# Patient Record
Sex: FEMALE | Race: OTHER | HISPANIC OR LATINO | URBAN - METROPOLITAN AREA
[De-identification: names, ages, dates, MRNs, and addresses within clinical notes are randomized per-mention and may not be internally consistent; named-entity substitution may affect disease eponyms.]

---

## 2021-07-15 ENCOUNTER — HOSPITAL ENCOUNTER (EMERGENCY)
Facility: HOSPITAL | Age: 21
Discharge: HOME | End: 2021-07-15
Attending: EMERGENCY MEDICINE | Admitting: EMERGENCY MEDICINE
Payer: COMMERCIAL

## 2021-07-15 VITALS
DIASTOLIC BLOOD PRESSURE: 60 MMHG | SYSTOLIC BLOOD PRESSURE: 125 MMHG | RESPIRATION RATE: 16 BRPM | WEIGHT: 135.58 LBS | TEMPERATURE: 98.2 F | HEART RATE: 62 BPM | OXYGEN SATURATION: 100 %

## 2021-07-15 DIAGNOSIS — R42 DIZZINESS: Primary | ICD-10-CM

## 2021-07-15 LAB
ANION GAP SERPL CALCULATED.3IONS-SCNC: 6 MMOL/L (ref 4–13)
ATRIAL RATE: 78 BPM
BASOPHILS # BLD AUTO: 0.03 THOUSANDS/ΜL (ref 0–0.1)
BASOPHILS NFR BLD AUTO: 1 % (ref 0–1)
BILIRUB UR QL STRIP: NEGATIVE
BUN SERPL-MCNC: 8 MG/DL (ref 5–25)
CALCIUM SERPL-MCNC: 8.5 MG/DL (ref 8.3–10.1)
CHLORIDE SERPL-SCNC: 105 MMOL/L (ref 100–108)
CLARITY UR: ABNORMAL
CO2 SERPL-SCNC: 28 MMOL/L (ref 21–32)
COLOR UR: YELLOW
CREAT SERPL-MCNC: 0.66 MG/DL (ref 0.6–1.3)
EOSINOPHIL # BLD AUTO: 0.06 THOUSAND/ΜL (ref 0–0.61)
EOSINOPHIL NFR BLD AUTO: 1 % (ref 0–6)
ERYTHROCYTE [DISTWIDTH] IN BLOOD BY AUTOMATED COUNT: 11.9 % (ref 11.6–15.1)
EXT PREG TEST URINE: NEGATIVE
EXT. CONTROL ED NAV: NORMAL
GFR SERPL CREATININE-BSD FRML MDRD: 127 ML/MIN/1.73SQ M
GLUCOSE SERPL-MCNC: 90 MG/DL (ref 65–140)
GLUCOSE UR STRIP-MCNC: NEGATIVE MG/DL
HCT VFR BLD AUTO: 42.5 % (ref 34.8–46.1)
HGB BLD-MCNC: 14.7 G/DL (ref 11.5–15.4)
HGB UR QL STRIP.AUTO: NEGATIVE
IMM GRANULOCYTES # BLD AUTO: 0.02 THOUSAND/UL (ref 0–0.2)
IMM GRANULOCYTES NFR BLD AUTO: 0 % (ref 0–2)
KETONES UR STRIP-MCNC: NEGATIVE MG/DL
LEUKOCYTE ESTERASE UR QL STRIP: NEGATIVE
LYMPHOCYTES # BLD AUTO: 2.62 THOUSANDS/ΜL (ref 0.6–4.47)
LYMPHOCYTES NFR BLD AUTO: 40 % (ref 14–44)
MCH RBC QN AUTO: 31.5 PG (ref 26.8–34.3)
MCHC RBC AUTO-ENTMCNC: 34.6 G/DL (ref 31.4–37.4)
MCV RBC AUTO: 91 FL (ref 82–98)
MONOCYTES # BLD AUTO: 0.53 THOUSAND/ΜL (ref 0.17–1.22)
MONOCYTES NFR BLD AUTO: 8 % (ref 4–12)
NEUTROPHILS # BLD AUTO: 3.24 THOUSANDS/ΜL (ref 1.85–7.62)
NEUTS SEG NFR BLD AUTO: 50 % (ref 43–75)
NITRITE UR QL STRIP: NEGATIVE
NRBC BLD AUTO-RTO: 0 /100 WBCS
P AXIS: 69 DEGREES
PH UR STRIP.AUTO: >=9 [PH] (ref 4.5–8)
PLATELET # BLD AUTO: 340 THOUSANDS/UL (ref 149–390)
PMV BLD AUTO: 9 FL (ref 8.9–12.7)
POTASSIUM SERPL-SCNC: 3.6 MMOL/L (ref 3.5–5.3)
PR INTERVAL: 156 MS
PROT UR STRIP-MCNC: NEGATIVE MG/DL
QRS AXIS: 67 DEGREES
QRSD INTERVAL: 100 MS
QT INTERVAL: 378 MS
QTC INTERVAL: 430 MS
RBC # BLD AUTO: 4.66 MILLION/UL (ref 3.81–5.12)
SODIUM SERPL-SCNC: 139 MMOL/L (ref 136–145)
SP GR UR STRIP.AUTO: 1.02 (ref 1–1.03)
T WAVE AXIS: 60 DEGREES
UROBILINOGEN UR QL STRIP.AUTO: 0.2 E.U./DL
VENTRICULAR RATE: 78 BPM
WBC # BLD AUTO: 6.5 THOUSAND/UL (ref 4.31–10.16)

## 2021-07-15 PROCEDURE — 81003 URINALYSIS AUTO W/O SCOPE: CPT

## 2021-07-15 PROCEDURE — 85025 COMPLETE CBC W/AUTO DIFF WBC: CPT | Performed by: PHYSICIAN ASSISTANT

## 2021-07-15 PROCEDURE — 93010 ELECTROCARDIOGRAM REPORT: CPT | Performed by: INTERNAL MEDICINE

## 2021-07-15 PROCEDURE — 36415 COLL VENOUS BLD VENIPUNCTURE: CPT | Performed by: PHYSICIAN ASSISTANT

## 2021-07-15 PROCEDURE — 96361 HYDRATE IV INFUSION ADD-ON: CPT

## 2021-07-15 PROCEDURE — 80048 BASIC METABOLIC PNL TOTAL CA: CPT | Performed by: PHYSICIAN ASSISTANT

## 2021-07-15 PROCEDURE — 99284 EMERGENCY DEPT VISIT MOD MDM: CPT

## 2021-07-15 PROCEDURE — 96374 THER/PROPH/DIAG INJ IV PUSH: CPT

## 2021-07-15 PROCEDURE — 81025 URINE PREGNANCY TEST: CPT | Performed by: EMERGENCY MEDICINE

## 2021-07-15 PROCEDURE — 99285 EMERGENCY DEPT VISIT HI MDM: CPT | Performed by: PHYSICIAN ASSISTANT

## 2021-07-15 PROCEDURE — 93005 ELECTROCARDIOGRAM TRACING: CPT

## 2021-07-15 RX ORDER — KETOROLAC TROMETHAMINE 30 MG/ML
15 INJECTION, SOLUTION INTRAMUSCULAR; INTRAVENOUS ONCE
Status: COMPLETED | OUTPATIENT
Start: 2021-07-15 | End: 2021-07-15

## 2021-07-15 RX ORDER — MECLIZINE HYDROCHLORIDE 25 MG/1
25 TABLET ORAL 3 TIMES DAILY PRN
Qty: 30 TABLET | Refills: 0 | Status: SHIPPED | OUTPATIENT
Start: 2021-07-15

## 2021-07-15 RX ORDER — NAPROXEN 500 MG/1
500 TABLET ORAL 2 TIMES DAILY WITH MEALS
Qty: 20 TABLET | Refills: 0 | Status: SHIPPED | OUTPATIENT
Start: 2021-07-15 | End: 2022-07-15

## 2021-07-15 RX ORDER — MECLIZINE HCL 12.5 MG/1
12.5 TABLET ORAL ONCE
Status: COMPLETED | OUTPATIENT
Start: 2021-07-15 | End: 2021-07-15

## 2021-07-15 RX ADMIN — SODIUM CHLORIDE 1000 ML: 0.9 INJECTION, SOLUTION INTRAVENOUS at 12:31

## 2021-07-15 RX ADMIN — KETOROLAC TROMETHAMINE 15 MG: 30 INJECTION, SOLUTION INTRAMUSCULAR; INTRAVENOUS at 12:31

## 2021-07-15 RX ADMIN — MECLIZINE 12.5 MG: 12.5 TABLET ORAL at 12:31

## 2021-07-15 NOTE — ED NOTES
[facility]  [id number] used to explain treatment plan     Brandin Edwards RN  07/15/21 8386

## 2021-07-15 NOTE — ED PROVIDER NOTES
History  Chief Complaint   Patient presents with    Headache     Pt reports headache with dizziness for the past 2 days  Denies vomitting  No medication taken pta  Patient presents emergency department feeling dizzy for the past 2-3 days  She denies any nausea or vomiting  She admits to a mild headache  No cough congestion or fevers  She denies any abdominal pain or diarrhea  Patient states that the dizziness is not as bad today as it was yesterday but she was concerned with her ongoing symptoms and came in for evaluation  Patient has not taken anything for her symptoms  Patient also states that she feels that her hands seem a little bit more pale in color  She was concerned because she states Dorlene Postin are usually a lot more red and they look more white now  None       History reviewed  No pertinent past medical history  History reviewed  No pertinent surgical history  History reviewed  No pertinent family history  I have reviewed and agree with the history as documented  E-Cigarette/Vaping     E-Cigarette/Vaping Substances     Social History     Tobacco Use    Smoking status: Never Smoker    Smokeless tobacco: Former User   Substance Use Topics    Alcohol use: Never    Drug use: Never       Review of Systems   Respiratory: Negative  Cardiovascular: Negative  Gastrointestinal: Negative  Neurological: Positive for dizziness and headaches  All other systems reviewed and are negative  Physical Exam  Physical Exam  Vitals and nursing note reviewed  Constitutional:       Appearance: She is well-developed  HENT:      Head: Normocephalic and atraumatic  Right Ear: Tympanic membrane and external ear normal       Left Ear: Tympanic membrane and external ear normal    Eyes:      Conjunctiva/sclera: Conjunctivae normal    Cardiovascular:      Rate and Rhythm: Normal rate and regular rhythm  Heart sounds: Normal heart sounds     Pulmonary:      Effort: Pulmonary effort is normal       Breath sounds: Normal breath sounds  Abdominal:      General: Bowel sounds are normal       Palpations: Abdomen is soft  Musculoskeletal:         General: Normal range of motion  Cervical back: Neck supple  Lymphadenopathy:      Cervical: No cervical adenopathy  Skin:     General: Skin is warm  Findings: No rash  Neurological:      General: No focal deficit present  Mental Status: She is alert and oriented to person, place, and time  Sensory: Sensation is intact  Motor: No weakness  Coordination: Coordination is intact  Romberg sign negative   Finger-Nose-Finger Test normal    Psychiatric:         Behavior: Behavior normal          Vital Signs  ED Triage Vitals [07/15/21 1149]   Temperature Pulse Respirations Blood Pressure SpO2   98 2 °F (36 8 °C) 62 16 125/60 100 %      Temp Source Heart Rate Source Patient Position - Orthostatic VS BP Location FiO2 (%)   Oral Monitor Sitting Right arm --      Pain Score       --           Vitals:    07/15/21 1149   BP: 125/60   Pulse: 62   Patient Position - Orthostatic VS: Sitting         Visual Acuity      ED Medications  Medications   sodium chloride 0 9 % bolus 1,000 mL (0 mL Intravenous Stopped 7/15/21 1330)   ketorolac (TORADOL) injection 15 mg (15 mg Intravenous Given 7/15/21 1231)   meclizine (ANTIVERT) tablet 12 5 mg (12 5 mg Oral Given 7/15/21 1231)       Diagnostic Studies  Results Reviewed     Procedure Component Value Units Date/Time    Basic metabolic panel [[id number]] Collected: 07/15/21 1233    Lab Status: Final result Specimen: Blood from Arm, Right Updated: 07/15/21 1256     Sodium 139 mmol/L      Potassium 3 6 mmol/L      Chloride 105 mmol/L      CO2 28 mmol/L      ANION GAP 6 mmol/L      BUN 8 mg/dL      Creatinine 0 66 mg/dL      Glucose 90 mg/dL      Calcium 8 5 mg/dL      eGFR 127 ml/min/1 73sq m     Narrative:      [place] guidelines for Chronic Kidney Disease (CKD):   Stage 1 with normal or high GFR (GFR > 90 mL/min/1 73 square meters)    Stage 2 Mild CKD (GFR = 60-89 mL/min/1 73 square meters)    Stage 3A Moderate CKD (GFR = 45-59 mL/min/1 73 square meters)    Stage 3B Moderate CKD (GFR = 30-44 mL/min/1 73 square meters)    Stage 4 Severe CKD (GFR = 15-29 mL/min/1 73 square meters)    Stage 5 End Stage CKD (GFR <15 mL/min/1 73 square meters)  Note: GFR calculation is accurate only with a steady state creatinine    CBC and differential [[id number]] Collected: 07/15/21 1233    Lab Status: Final result Specimen: Blood from Arm, Right Updated: 07/15/21 1242     WBC 6 50 Thousand/uL      RBC 4 66 Million/uL      Hemoglobin 14 7 g/dL      Hematocrit 42 5 %      MCV 91 fL      MCH 31 5 pg      MCHC 34 6 g/dL      RDW 11 9 %      MPV 9 0 fL      Platelets 856 Thousands/uL      nRBC 0 /100 WBCs      Neutrophils Relative 50 %      Immat GRANS % 0 %      Lymphocytes Relative 40 %      Monocytes Relative 8 %      Eosinophils Relative 1 %      Basophils Relative 1 %      Neutrophils Absolute 3 24 Thousands/µL      Immature Grans Absolute 0 02 Thousand/uL      Lymphocytes Absolute 2 62 Thousands/µL      Monocytes Absolute 0 53 Thousand/µL      Eosinophils Absolute 0 06 Thousand/µL      Basophils Absolute 0 03 Thousands/µL     POCT pregnancy, urine [[id number]]  (Normal) Resulted: 07/15/21 1201    Lab Status: Final result Updated: 07/15/21 1201     EXT PREG TEST UR (Ref: Negative) negative     Control valid    Urine Macroscopic, POC [[id number]]  (Abnormal) Collected: 07/15/21 1158    Lab Status: Final result Specimen: Urine Updated: 07/15/21 1159     Color, UA Yellow     Clarity, UA Slightly Cloudy     pH, UA >=9 0     Leukocytes, UA Negative     Nitrite, UA Negative     Protein, UA Negative mg/dl      Glucose, UA Negative mg/dl      Ketones, UA Negative mg/dl      Urobilinogen, UA 0 2 E U /dl      Bilirubin, UA Negative     Blood, UA Negative     Specific Sinclair, UA 1 020 Narrative:      CLINITEK RESULT                 No orders to display              Procedures  ECG 12 Lead Documentation Only    Date/Time: 7/15/2021 12:39 PM  Performed by: Kathy Butt PA-C  Authorized by: Kathy Butt PA-C     Indications / Diagnosis:  Dizziness  Patient location:  ED  Previous ECG:     Previous ECG:  Unavailable  Rate:     ECG rate:  78    ECG rate assessment: normal    Rhythm:     Rhythm: sinus rhythm    Ectopy:     Ectopy: none    QRS:     QRS axis:  Normal  Conduction:     Conduction: normal    ST segments:     ST segments:  Normal  T waves:     T waves: normal    Comments:      Sinus arrhythmia              ED Course                             SBIRT 20yo+      Most Recent Value   SBIRT (24 yo +)   In order to provide better care to our patients, we are screening all of our patients for alcohol and drug use  Would it be okay to ask you these screening questions? No Filed at: 07/15/2021 1252                    [facility]  Number of Diagnoses or Management Options  Dizziness: new and requires workup     Amount and/or Complexity of Data Reviewed  Clinical lab tests: reviewed    Risk of Complications, Morbidity, and/or Mortality  General comments: Instructions reviewed w pt  Discussed results patient's symptoms have all resolved she is feeling much better  Patient Progress  Patient progress: improved      Disposition  Final diagnoses:   Dizziness     Time reflects when diagnosis was documented in both MDM as applicable and the Disposition within this note     Time User Action Codes Description Comment    7/15/2021  1:10 PM Mignon Us Add [R42] Dizziness       ED Disposition     ED Disposition Condition Date/Time Comment    Discharge Stable u Jul 15, 2021  1:10 PM Mike Toro discharge to home/self care              Follow-up Information     Follow up With Specialties Details Why Contact Basilia Manley    [phone]            Discharge Medication List as of 7/15/2021  1:11 PM      START taking these medications    Details   meclizine (ANTIVERT) 25 mg tablet Take 1 tablet (25 mg total) by mouth 3 (three) times a day as needed for dizziness, Starting Thu 7/15/2021, Print      naproxen (EC NAPROSYN) 500 MG EC tablet Take 1 tablet (500 mg total) by mouth 2 (two) times a day with meals PRN Headache, Starting Thu 7/15/2021, Until Fri 7/15/2022, Print           No discharge procedures on file      PDMP Review     None          ED Provider  Electronically Signed by           Tami Flores PA-C  07/15/21 4210

## 2022-08-15 ENCOUNTER — HOSPITAL ENCOUNTER (EMERGENCY)
Facility: HOSPITAL | Age: 22
Discharge: HOME | End: 2022-08-15
Attending: EMERGENCY MEDICINE
Payer: COMMERCIAL

## 2022-08-15 VITALS
DIASTOLIC BLOOD PRESSURE: 60 MMHG | OXYGEN SATURATION: 99 % | RESPIRATION RATE: 16 BRPM | TEMPERATURE: 98.7 F | WEIGHT: 132.28 LBS | SYSTOLIC BLOOD PRESSURE: 129 MMHG | HEART RATE: 103 BPM

## 2022-08-15 DIAGNOSIS — J06.9 VIRAL URI WITH COUGH: Primary | ICD-10-CM

## 2022-08-15 LAB — SARS-COV-2 RNA RESP QL NAA+PROBE: POSITIVE

## 2022-08-15 PROCEDURE — 99284 EMERGENCY DEPT VISIT MOD MDM: CPT

## 2022-08-15 PROCEDURE — 99283 EMERGENCY DEPT VISIT LOW MDM: CPT

## 2022-08-15 PROCEDURE — U0005 INFEC AGEN DETEC AMPLI PROBE: HCPCS

## 2022-08-15 PROCEDURE — U0003 INFECTIOUS AGENT DETECTION BY NUCLEIC ACID (DNA OR RNA); SEVERE ACUTE RESPIRATORY SYNDROME CORONAVIRUS 2 (SARS-COV-2) (CORONAVIRUS DISEASE [COVID-19]), AMPLIFIED PROBE TECHNIQUE, MAKING USE OF HIGH THROUGHPUT TECHNOLOGIES AS DESCRIBED BY CMS-2020-01-R: HCPCS

## 2022-08-15 NOTE — ED PROVIDER NOTES
History  Chief Complaint   Patient presents with    URI     Pt c/o cough and sore throat for 2 days     24 y/o female with no significant PMH who presents with cough and sore throat x1 day  Asking for COVID test  Non-productive cough, generalized body aches, headache  Has not received COVID vaccinations  Denies f/c, CP, SOB, abdominal pain, N/V/D, no LE swelling, no visual changes, no hearing changes  No travel, no sick contact  Prior to Admission Medications   Prescriptions Last Dose Informant Patient Reported? Taking?   meclizine (ANTIVERT) 25 mg tablet   No No   Sig: Take 1 tablet (25 mg total) by mouth 3 (three) times a day as needed for dizziness   naproxen (EC NAPROSYN) 500 MG EC tablet   No No   Sig: Take 1 tablet (500 mg total) by mouth 2 (two) times a day with meals PRN Headache      Facility-Administered Medications: None       History reviewed  No pertinent past medical history  History reviewed  No pertinent surgical history  History reviewed  No pertinent family history  I have reviewed and agree with the history as documented  E-Cigarette/Vaping     E-Cigarette/Vaping Substances     Social History     Tobacco Use    Smoking status: Never Smoker    Smokeless tobacco: Former User   Substance Use Topics    Alcohol use: Never    Drug use: Never       Review of Systems   Constitutional: Negative for chills and fever  HENT: Positive for sore throat  Negative for ear discharge, ear pain, hearing loss, postnasal drip, rhinorrhea, sneezing and trouble swallowing  Eyes: Negative for pain and visual disturbance  Respiratory: Positive for cough (non-productive)  Negative for shortness of breath  Cardiovascular: Negative for chest pain and leg swelling  Gastrointestinal: Negative for abdominal pain, diarrhea, nausea and vomiting  Genitourinary: Negative  Musculoskeletal: Negative  Skin: Negative  Neurological: Positive for headaches   Negative for dizziness, syncope, weakness, light-headedness and numbness  Physical Exam  Physical Exam  Vitals reviewed  Constitutional:       General: She is not in acute distress  Appearance: Normal appearance  She is normal weight  She is not ill-appearing  HENT:      Head: Normocephalic and atraumatic  Right Ear: External ear normal       Left Ear: External ear normal       Nose: Nose normal       Mouth/Throat:      Mouth: Mucous membranes are moist       Pharynx: Oropharynx is clear  No oropharyngeal exudate or posterior oropharyngeal erythema  Eyes:      General:         Right eye: No discharge  Left eye: No discharge  Extraocular Movements: Extraocular movements intact  Conjunctiva/sclera: Conjunctivae normal    Cardiovascular:      Rate and Rhythm: Normal rate and regular rhythm  Heart sounds: Normal heart sounds  Pulmonary:      Effort: Pulmonary effort is normal       Breath sounds: Normal breath sounds  Musculoskeletal:      Cervical back: Normal range of motion and neck supple  No tenderness  Right lower leg: No edema  Left lower leg: No edema  Skin:     General: Skin is warm and dry  Capillary Refill: Capillary refill takes less than 2 seconds  Neurological:      General: No focal deficit present  Mental Status: She is alert and oriented to person, place, and time  Mental status is at baseline  Vital Signs  ED Triage Vitals [08/15/22 1339]   Temperature Pulse Respirations Blood Pressure SpO2   98 7 °F (37 1 °C) 103 16 129/60 99 %      Temp Source Heart Rate Source Patient Position - Orthostatic VS BP Location FiO2 (%)   Oral -- -- -- --      Pain Score       4           Vitals:    08/15/22 1339   BP: 129/60   Pulse: 103         Visual Acuity      ED Medications  Medications - No data to display    Diagnostic Studies  Results Reviewed     Procedure Component Value Units Date/Time    COVID only [[id number]] Collected: 08/15/22 1541    Lab Status:  In process Specimen: Nares from Nose Updated: 08/15/22 1546                 No orders to display              Procedures  Procedures         ED Course                                             MDM  Number of Diagnoses or Management Options  Diagnosis management comments: 26 y/o female with no significant PMH who presents with generalized body aches, non-productive cough, and sore throat x1 day  Deny f/c, cp, sob, abd pain, n/v/d, urinary symptoms, LE swelling  Lungs cta b/l, heart RRR  Oral exam with no exudates or erythema    COVID test, told will call if positive result  Discussed return criteria to ED  Amount and/or Complexity of Data Reviewed  Clinical lab tests: ordered and reviewed  Tests in the medicine section of CPT®: reviewed and ordered  Discuss the patient with other providers: yes        Disposition  Final diagnoses:   Viral URI with cough     Time reflects when diagnosis was documented in both MDM as applicable and the Disposition within this note     Time User Action Codes Description Comment    8/15/2022  3:38 PM Emigdio Quinones Add [J06 9] Viral URI with cough       ED Disposition     ED Disposition   Discharge    Condition   Stable    Date/Time   Mon Aug 15, 2022  3:38 PM    Cristobal Scott discharge to home/self care  Follow-up Information    None         Discharge Medication List as of 8/15/2022  3:43 PM      CONTINUE these medications which have NOT CHANGED    Details   meclizine (ANTIVERT) 25 mg tablet Take 1 tablet (25 mg total) by mouth 3 (three) times a day as needed for dizziness, Starting Thu 7/15/2021, Print      naproxen (EC NAPROSYN) 500 MG EC tablet Take 1 tablet (500 mg total) by mouth 2 (two) times a day with meals PRN Headache, Starting Thu 7/15/2021, Until Fri 7/15/2022, Print             No discharge procedures on file      PDMP Review     None          ED Provider  Electronically Signed by           Heide Renee PA-C  08/15/22 8981

## 2022-08-15 NOTE — ED ATTENDING ATTESTATION
8/15/2022  Malorie ROMERO, saw and evaluated the patient  I have discussed the patient with the resident/non-physician practitioner and agree with the resident's/non-physician practitioner's findings, Plan of Care, and MDM as documented in the resident's/non-physician practitioner's note, except where noted  All available labs and Radiology studies were reviewed  I was present for key portions of any procedure(s) performed by the resident/non-physician practitioner and I was immediately available to provide assistance  At this point I agree with the current assessment done in the Emergency Department  I have conducted an independent evaluation of this patient a history and physical is as follows:      A 25-year-old female with no significant past medical history; presents with a nonproductive cough, headache, generalized body aches and sore throat that began today  Patient otherwise denies fever, chills, chest pain, shortness of breath, abdominal pain, nausea, vomiting, diarrhea, peripheral edema and rashes  Pt denies known sick contacts  Physical Exam  General: awake, alert, no acute distress  Head: normocephalic, atraumatic  Eyes: no scleral icterus  Ears: external ears normal, hearing grossly intact  Nose: external exam grossly normal, negative nasal discharge  Neck: symmetric, No JVD noted, trachea midline  Pulmonary: no respiratory distress, no tachypnea noted  Cardiovascular: appears well perfused  Abdomen: no distention noted  Musculoskeletal: no deformities noted, tone normal  Neuro: grossly non-focal, pt voluntarily moving bilateral upper and lower extremities equally and purposefully  Psych: mood and affect appropriate    Assessment and Plan:  Upper respiratory infection, likely viral in nature  Will screen for COVID  Recommend continued symptomatic treatment        ED Course         Critical Care Time  Procedures

## 2022-08-15 NOTE — DISCHARGE INSTRUCTIONS
Te llamaremos si le sale positivo el examen de COVID    Puedes samantha acetaminophen o advil para dolor de cuerpo, fiebre     Regresar a urgencias si tienes dificultad respirando, dolor de peche, toz con Terrell

## 2022-08-22 ENCOUNTER — HOSPITAL ENCOUNTER (EMERGENCY)
Facility: HOSPITAL | Age: 22
Discharge: HOME | End: 2022-08-22
Attending: EMERGENCY MEDICINE | Admitting: EMERGENCY MEDICINE
Payer: COMMERCIAL

## 2022-08-22 VITALS
TEMPERATURE: 98.4 F | SYSTOLIC BLOOD PRESSURE: 113 MMHG | HEART RATE: 64 BPM | WEIGHT: 129.85 LBS | RESPIRATION RATE: 18 BRPM | OXYGEN SATURATION: 99 % | DIASTOLIC BLOOD PRESSURE: 61 MMHG

## 2022-08-22 DIAGNOSIS — Z02.89 ENCOUNTER TO OBTAIN EXCUSE FROM WORK: Primary | ICD-10-CM

## 2022-08-22 PROCEDURE — 99281 EMR DPT VST MAYX REQ PHY/QHP: CPT | Performed by: PHYSICIAN ASSISTANT

## 2022-08-22 PROCEDURE — 99281 EMR DPT VST MAYX REQ PHY/QHP: CPT

## 2022-08-22 NOTE — Clinical Note
Mary Forde was seen and treated in our emergency department on 8/22/2022  Diagnosis:     Rangel Chapman  may return to work on return date  She may return on this date: 08/23/2022    Patient has completed the recommended quarantine timeframe for COVID and has been without symptoms for 24 hours  Patient may return to work  Repeat testing is not indicated  If you have any questions or concerns, please don't hesitate to call        Yolette Teixeira PA-C    ______________________________           _______________          _______________  Hospital Representative                              Date                                Time

## 2022-08-22 NOTE — ED PROVIDER NOTES
HPI: Patient is a 25 y o  female who presents with  no symptoms, requires screening  Patient was diagnosed with COVID 1 week ago when she had URI symptoms  Patient states that all her symptoms have resolved and she is feeling better  Patient states she has not had symptoms for at least the last 24 hours, including fevers, chills, diaphoresis, congestion, cough  Patient states she is otherwise in her usual state of health and denies any headache, dizziness, chest pain, difficulty breathing, abdominal pain, vomiting, diarrhea, urinary changes  Patient came today to have repeat COVID test in order to return to work  Allergies   Allergen Reactions    Penicillins Hives       History reviewed  No pertinent past medical history  History reviewed  No pertinent surgical history  Social History     Tobacco Use    Smoking status: Never Smoker    Smokeless tobacco: Former User   Substance Use Topics    Alcohol use: Never    Drug use: Never       Nursing notes reviewed  Physical Exam:  ED Triage Vitals [08/22/22 0935]   Temperature Pulse Respirations Blood Pressure SpO2   98 4 °F (36 9 °C) 64 18 113/61 99 %      Temp Source Heart Rate Source Patient Position - Orthostatic VS BP Location FiO2 (%)   Oral Monitor Sitting Right arm --      Pain Score       No Pain           ROS: Positive for nothing  The remainder of a 10 organ system ROS was otherwise unremarkable    General: awake, alert, no acute distress    Head: normocephalic, atraumatic    Eyes: no scleral icterus  Ears: external ears normal, hearing grossly intact  Nose: external exam grossly normal, negative nasal discharge  Neck: symmetric, No JVD noted, trachea midline  Pulmonary: no respiratory distress, no tachypnea noted  Cardiovascular: appears well perfused  Abdomen: no distention noted  Musculoskeletal: no deformities noted, tone normal  Neuro: grossly non-focal  Psych: mood and affect appropriate    The patient is stable and has a history and physical exam consistent with a viral illness  COVID19 testing has not been performed  I considered the patient's other medical conditions as applicable/noted above in my medical decision making  The patient is stable upon discharge  The plan is for supportive care at home  Repeat COVID testing for negative test in order to return to work is not indicated  Provided education to patient, answered questions  Patient has completed appropriate quarantine time frame and is appropriate to return to work  Provided information accordingly  Recommended follow-up with PCP as needed  The management plan was discussed in detail with the patient at bedside and all questions were answered  Provided both verbal and written instructions  Reviewed red flag symptoms and strict return to ED instructions  Patient notes understanding and agrees to plan  The patient (and any family present) verbalized understanding of the discharge instructions and warnings that would necessitate return to the Emergency Department  All questions were answered prior to discharge  Medications - No data to display  Final diagnoses:   Encounter to obtain excuse from work     Time reflects when diagnosis was documented in both MDM as applicable and the Disposition within this note     Time User Action Codes Description Comment    8/22/2022  9:47 AM Kimber Clark Add [Z02 [id number]] Encounter to obtain excuse from work       ED Disposition     ED Disposition   Discharge    Condition   Stable    Date/Time   Mon Aug 22, 2022  9:47 AM    Comment   Noreen Najera discharge to home/self care                 Follow-up Information     Follow up With Specialties Details Why Contact Info Additional [address] [facility] Emergency Department Emergency Medicine  If symptoms worsen [place] [id number]  [address] [facility] Emergency Department, [address] [facility] [address] , [place] [facility], [id number] Follow-up with PCP as needed             Patient's Medications   Discharge Prescriptions    No medications on file     No discharge procedures on file      Electronically Signed by     Chana Kaye PA-C  08/22/22 0563

## 2022-12-28 ENCOUNTER — HOSPITAL ENCOUNTER (EMERGENCY)
Facility: HOSPITAL | Age: 22
Discharge: HOME | End: 2022-12-28
Attending: EMERGENCY MEDICINE

## 2022-12-28 VITALS
SYSTOLIC BLOOD PRESSURE: 135 MMHG | RESPIRATION RATE: 20 BRPM | TEMPERATURE: 98.2 F | WEIGHT: 140.43 LBS | OXYGEN SATURATION: 100 % | DIASTOLIC BLOOD PRESSURE: 61 MMHG | HEART RATE: 95 BPM

## 2022-12-28 DIAGNOSIS — R68.89 FLU-LIKE SYMPTOMS: Primary | ICD-10-CM

## 2022-12-28 RX ORDER — IBUPROFEN 400 MG/1
400 TABLET ORAL EVERY 6 HOURS PRN
Qty: 20 TABLET | Refills: 0 | Status: SHIPPED | OUTPATIENT
Start: 2022-12-28

## 2022-12-28 RX ORDER — ACETAMINOPHEN 500 MG
500 TABLET ORAL EVERY 6 HOURS PRN
Qty: 30 TABLET | Refills: 0 | Status: SHIPPED | OUTPATIENT
Start: 2022-12-28

## 2022-12-28 RX ORDER — BENZONATATE 100 MG/1
100 CAPSULE ORAL 3 TIMES DAILY PRN
Qty: 21 CAPSULE | Refills: 0 | Status: SHIPPED | OUTPATIENT
Start: 2022-12-28

## 2022-12-28 NOTE — Clinical Note
Lacy Zarate was seen and treated in our emergency department on 12/28/2022  Diagnosis:     Shikha    She may return on this date: 01/02/2023         If you have any questions or concerns, please don't hesitate to call        Bessie Riddle PA-C    ______________________________           _______________          _______________  Hospital Representative                              Date                                Time

## 2022-12-29 LAB
FLUAV RNA RESP QL NAA+PROBE: NEGATIVE
FLUBV RNA RESP QL NAA+PROBE: NEGATIVE
SARS-COV-2 RNA RESP QL NAA+PROBE: NEGATIVE

## 2022-12-29 NOTE — ED PROVIDER NOTES
HPI: Patient is a 25 y o  female who presents with four days of cough, sore throat and fatigue which the patient describes at moderate The patient has not had contact with people with similar symptoms  The patient taken OTC medication with relief of symptoms  Allergies   Allergen Reactions   • Penicillins Hives       History reviewed  No pertinent past medical history  History reviewed  No pertinent surgical history  Social History     Tobacco Use   • Smoking status: Never   • Smokeless tobacco: Former   Substance Use Topics   • Alcohol use: Never   • Drug use: Never       Nursing notes reviewed  Physical Exam:  ED Triage Vitals [12/28/22 1838]   Temperature Pulse Respirations Blood Pressure SpO2   98 2 °F (36 8 °C) 95 20 135/61 100 %      Temp Source Heart Rate Source Patient Position - Orthostatic VS BP Location FiO2 (%)   Oral Monitor Sitting Left arm --      Pain Score       --           ROS: Positive for cough, sore throat and fatigue, the remainder of a 10 organ system ROS was otherwise unremarkable  General: awake, alert, no acute distress  Head: normocephalic, atraumatic  Eyes: no scleral icterus  Ears: external ears normal, hearing grossly intact  Nose: external exam grossly normal, negative nasal discharge  Neck: symmetric, No JVD noted, trachea midline  Pulmonary: no respiratory distress, no tachypnea noted  Lungs CTAB  Cardiovascular: appears well perfused  RRR, no M/R/G  Abdomen: no distention noted  Musculoskeletal: no deformities noted, tone normal  Neuro: grossly non-focal  Psych: mood and affect appropriate    The patient is stable and has a history and physical exam consistent with a viral illness  COVID19/influenza testing has been performed  I considered the patient's other medical conditions as applicable/noted above in my medical decision making  The patient is stable upon discharge  The plan is for supportive care at home      The patient (and any family present) verbalized understanding of the discharge instructions and warnings that would necessitate return to the Emergency Department  All questions were answered prior to discharge  Medications - No data to display  Final diagnoses:   Flu-like symptoms     Time reflects when diagnosis was documented in both MDM as applicable and the Disposition within this note     Time User Action Codes Description Comment    12/28/2022  7:13 PM Lonnie Israel Sarkar [R68 89] Flu-like symptoms       ED Disposition     ED Disposition   Discharge    Condition   Stable    Date/Time   Wed Dec 28, 2022  7:12 PM    Cristobal 79 discharge to home/self care                 Follow-up Information     Follow up With Specialties Details Why [address] [facility] Emergency Department Emergency Medicine  If symptoms worsen [facility] [id number]  [address] [facility] [place], operated by [facility], [address], [place], [address]        Discharge Medication List as of 12/28/2022  7:19 PM      START taking these medications    Details   acetaminophen (TYLENOL) 500 mg tablet Take 1 tablet (500 mg total) by mouth every 6 (six) hours as needed for mild pain, moderate pain or fever, Starting Wed 12/28/2022, Normal      benzonatate (TESSALON PERLES) 100 mg capsule Take 1 capsule (100 mg total) by mouth 3 (three) times a day as needed for cough, Starting Wed 12/28/2022, Normal      ibuprofen (MOTRIN) 400 mg tablet Take 1 tablet (400 mg total) by mouth every 6 (six) hours as needed for mild pain, Starting Wed 12/28/2022, Normal         CONTINUE these medications which have NOT CHANGED    Details   meclizine (ANTIVERT) 25 mg tablet Take 1 tablet (25 mg total) by mouth 3 (three) times a day as needed for dizziness, Starting Thu 7/15/2021, Print      naproxen (EC NAPROSYN) 500 MG EC tablet Take 1 tablet (500 mg total) by mouth 2 (two) times a day with meals PRN Headache, Starting Thu 7/15/2021, Until Fri 7/15/2022, Print           No discharge procedures on file      Electronically Signed by       Khadar Durán PA-C  12/28/22 5869

## 2022-12-29 NOTE — DISCHARGE INSTRUCTIONS
Please refer to the attached information for strict return instructions  If symptoms worsen or new symptoms develop please return to the ER  We will call with COVID19/flu test results when available

## 2023-02-10 ENCOUNTER — HOSPITAL ENCOUNTER (EMERGENCY)
Facility: HOSPITAL | Age: 23
Discharge: HOME | End: 2023-02-10
Attending: EMERGENCY MEDICINE

## 2023-02-10 VITALS
RESPIRATION RATE: 17 BRPM | HEART RATE: 71 BPM | WEIGHT: 140.87 LBS | TEMPERATURE: 98.4 F | SYSTOLIC BLOOD PRESSURE: 134 MMHG | DIASTOLIC BLOOD PRESSURE: 65 MMHG | OXYGEN SATURATION: 100 %

## 2023-02-10 DIAGNOSIS — R03.0 ELEVATED BLOOD PRESSURE READING: ICD-10-CM

## 2023-02-10 DIAGNOSIS — R11.0 NAUSEA: ICD-10-CM

## 2023-02-10 DIAGNOSIS — R42 VERTIGO: ICD-10-CM

## 2023-02-10 DIAGNOSIS — R51.9 ACUTE HEADACHE: Primary | ICD-10-CM

## 2023-02-10 DIAGNOSIS — H65.90 SEROUS OTITIS MEDIA: ICD-10-CM

## 2023-02-10 LAB
EXT PREGNANCY TEST URINE: NEGATIVE
EXT. CONTROL: NORMAL

## 2023-02-10 RX ORDER — METOCLOPRAMIDE HYDROCHLORIDE 5 MG/ML
10 INJECTION INTRAMUSCULAR; INTRAVENOUS ONCE
Status: COMPLETED | OUTPATIENT
Start: 2023-02-10 | End: 2023-02-10

## 2023-02-10 RX ORDER — KETOROLAC TROMETHAMINE 30 MG/ML
15 INJECTION, SOLUTION INTRAMUSCULAR; INTRAVENOUS ONCE
Status: COMPLETED | OUTPATIENT
Start: 2023-02-10 | End: 2023-02-10

## 2023-02-10 RX ORDER — ACETAMINOPHEN 325 MG/1
650 TABLET ORAL ONCE
Status: COMPLETED | OUTPATIENT
Start: 2023-02-10 | End: 2023-02-10

## 2023-02-10 RX ORDER — FLUTICASONE PROPIONATE 50 MCG
1 SPRAY, SUSPENSION (ML) NASAL DAILY
Qty: 16 G | Refills: 0 | Status: SHIPPED | OUTPATIENT
Start: 2023-02-10

## 2023-02-10 RX ORDER — MECLIZINE HYDROCHLORIDE 25 MG/1
25 TABLET ORAL 3 TIMES DAILY PRN
Qty: 30 TABLET | Refills: 0 | Status: SHIPPED | OUTPATIENT
Start: 2023-02-10

## 2023-02-10 RX ORDER — LORATADINE 10 MG/1
10 TABLET ORAL ONCE
Status: COMPLETED | OUTPATIENT
Start: 2023-02-10 | End: 2023-02-10

## 2023-02-10 RX ORDER — FLUTICASONE PROPIONATE 50 MCG
1 SPRAY, SUSPENSION (ML) NASAL DAILY
Status: DISCONTINUED | OUTPATIENT
Start: 2023-02-10 | End: 2023-02-10 | Stop reason: HOSPADM

## 2023-02-10 RX ORDER — ONDANSETRON 4 MG/1
4 TABLET, FILM COATED ORAL EVERY 6 HOURS
Qty: 12 TABLET | Refills: 0 | Status: SHIPPED | OUTPATIENT
Start: 2023-02-10

## 2023-02-10 RX ORDER — LORATADINE 10 MG/1
10 TABLET ORAL DAILY
Qty: 20 TABLET | Refills: 0 | Status: SHIPPED | OUTPATIENT
Start: 2023-02-10

## 2023-02-10 RX ORDER — MECLIZINE HYDROCHLORIDE 25 MG/1
25 TABLET ORAL ONCE
Status: COMPLETED | OUTPATIENT
Start: 2023-02-10 | End: 2023-02-10

## 2023-02-10 RX ADMIN — MECLIZINE HYDROCHLORIDE 25 MG: 25 TABLET ORAL at 14:06

## 2023-02-10 RX ADMIN — ACETAMINOPHEN 325MG 650 MG: 325 TABLET ORAL at 14:06

## 2023-02-10 RX ADMIN — FLUTICASONE PROPIONATE 1 SPRAY: 50 SPRAY, METERED NASAL at 14:06

## 2023-02-10 RX ADMIN — KETOROLAC TROMETHAMINE 15 MG: 30 INJECTION, SOLUTION INTRAMUSCULAR; INTRAVENOUS at 14:24

## 2023-02-10 RX ADMIN — METOCLOPRAMIDE 10 MG: 5 INJECTION, SOLUTION INTRAMUSCULAR; INTRAVENOUS at 14:02

## 2023-02-10 RX ADMIN — LORATADINE 10 MG: 10 TABLET ORAL at 14:06

## 2023-02-10 RX ADMIN — SODIUM CHLORIDE 500 ML: 0.9 INJECTION, SOLUTION INTRAVENOUS at 14:01

## 2023-02-10 NOTE — Clinical Note
Anne Marie Sheppard was seen and treated in our emergency department on 2/10/2023  Diagnosis:     Shalonda Lewis  may return to work on return date  She may return on this date: 02/11/2023         If you have any questions or concerns, please don't hesitate to call        Thomas Quinoens MD    ______________________________           _______________          _______________  Hospital Representative                              Date                                Time

## 2023-02-10 NOTE — ED ATTENDING ATTESTATION
2/10/2023  IPavan MD, saw and evaluated the patient  I have discussed the patient with the resident/non-physician practitioner and agree with the resident's/non-physician practitioner's findings, Plan of Care, and MDM as documented in the resident's/non-physician practitioner's note, except where noted  All available labs and Radiology studies were reviewed  I was present for key portions of any procedure(s) performed by the resident/non-physician practitioner and I was immediately available to provide assistance  At this point I agree with the current assessment done in the Emergency Department  I have conducted an independent evaluation of this patient a history and physical is as follows:    Frontal headache for several days no fever  No ear pain  Had some vertiginous symptoms  Patient is feeling much better after medical treatment at the present time  Cranial nerves are intact  Has some bulging of the left TM with effusion but no obvious infection  She symptomatically feels better and otherwise has normal neurological exam, is ambulatory  Vital signs are stable  No indication for head CT as she has a normal neurological exam   No fever to suggest a significant infectious disease      ED Course         Critical Care Time  Procedures

## 2023-02-10 NOTE — DISCHARGE INSTRUCTIONS
Usted fue evaluado en el departamento de emergencias por: dolor de errol  Puede acceder a serge resultados actuales y pendientes a través de [facility]  Un radiólogo revisará serge radiografías por segunda vez, si tiene [place], y se comunicará con usted si hay nuevos hallazgos  Debe hacer un seguimiento con beckwith proveedor de atención primaria, tan pronto owen sea posible, para snehal reevaluación  Si no tiene un proveedor de [employer], lo he remitido a Atención primaria de [email or web address]  Se le contactará para programar snehal arpita  [place] número de teléfono también se incluye en carolyn papeleo  También lo moscoso derivado a neurología y [place] debe hacer un seguimiento con ellos  Le recomiendo que busque en [employer] y [place] un diario de dolor de errol antes de verlos  Puede samantha 650 mg de tylenol cada cuatro a seis horas, sin exceder los 3000 mg diarios, para controlar beckwith malestar  También puede samantha ibuprofeno, 400 mg cada seis u ocho horas  [place] evaluación no reveló características emergentes en carolyn momento; sin embargo, muchos procesos de enfermedad son dinámicos:    Por favor, regrese al departamento de emergencias si experimenta síntomas nuevos o que empeoran, fiebre, dolor de [place], falta de [place], dificultad para respirar, [employer], dolor abdominal, náuseas/vómitos persistentes, síncope o desmayo, kapil en la orina o las [place], tos kapil, [employer] piernas, retención urinaria, incontinencia intestinal o vesical, entumecimiento entre las piernas  Además, se midió que beckwith presión arterial era rasheeda  [place] es algo que debe discutir con beckwith proveedor de atención primaria y volver a verificar dentro de snehal semana     ________________________    [place] were evaluated in the emergency department for: headache  You can access your current and pending results through [facility]  A radiologist will take a second look at your X-Rays, if you had any, and you will be contacted with any new findings  You should follow-up with your primary care provider, as soon as possible, for re-evaluation  If you do not have a primary care provider, I have referred you to [address] [place] [address] Rd  You will be contacted about scheduling an appointment  Their phone number is also included on this paperwork  You have also been referred to neurology and you should follow up with them as well  I recommend that you google and keep a headache journal prior to seeing them  You may take 650mg of tylenol every four to six hours, not exceeding 3,000mg daily, for the management of your discomfort  You may also take ibuprofen, 400mg every six to eight hours  Your workup revealed no emergent features at this time; however, many disease processes are dynamic:    Please, return to the emergency department if you experience new or worsening symptoms, fever, chest pain, shortness of breath, difficulty breathing, dizziness, abdominal pain, persistent nausea/vomiting, syncope or passing out, blood in your urine or stool, coughing up blood, leg swelling/pain, urinary retention, bowel or bladder incontinence, numbness between your legs  Additionally, your blood pressure was measured to be high  This is something that you should discuss with your primary care provider and have re-checked within one week

## 2023-02-10 NOTE — ED PROVIDER NOTES
History  Chief Complaint   Patient presents with   • Headache     Pt c/o nausea, headache and dizziness since Tuesday denies fever     Patient is a 26-year-old female, with no pertinent medical history, who presents to the ED today with a 3-day history of atraumatic, gradual onset, bilateral frontal, constant, waxing and waning, moderate intensity, not further characterized headache that feels similar in character to prior headaches that the patient has had while on her menstrual period  Patient also reports associated vertiginous dizziness as well as nausea which she states are new during this episode  Patient reports vaginal bleeding stating that she is on her menstrual period  Patient denies associated fever, rash, weakness, numbness, dysphagia, vision change  No clear exacerbating factors  Patient has not taken anything to remit her symptoms  Patient is without other concerns at this time  Prior to Admission Medications   Prescriptions Last Dose Informant Patient Reported? Taking?   acetaminophen (TYLENOL) 500 mg tablet   No No   Sig: Take 1 tablet (500 mg total) by mouth every 6 (six) hours as needed for mild pain, moderate pain or fever   benzonatate (TESSALON PERLES) 100 mg capsule   No No   Sig: Take 1 capsule (100 mg total) by mouth 3 (three) times a day as needed for cough   ibuprofen (MOTRIN) 400 mg tablet   No No   Sig: Take 1 tablet (400 mg total) by mouth every 6 (six) hours as needed for mild pain   meclizine (ANTIVERT) 25 mg tablet   No No   Sig: Take 1 tablet (25 mg total) by mouth 3 (three) times a day as needed for dizziness   Patient not taking: Reported on 12/28/2022   naproxen (EC NAPROSYN) 500 MG EC tablet   No No   Sig: Take 1 tablet (500 mg total) by mouth 2 (two) times a day with meals PRN Headache      Facility-Administered Medications: None       History reviewed  No pertinent past medical history  History reviewed  No pertinent surgical history  History reviewed   No pertinent family history  I have reviewed and agree with the history as documented  E-Cigarette/Vaping     E-Cigarette/Vaping Substances     Social History     Tobacco Use   • Smoking status: Never   • Smokeless tobacco: Former   Substance Use Topics   • Alcohol use: Never   • Drug use: Never        Review of Systems   Constitutional: Negative for fever  HENT: Negative for trouble swallowing  Eyes: Negative for visual disturbance  Respiratory: Negative for shortness of breath  Cardiovascular: Negative for chest pain  Gastrointestinal: Positive for nausea  Negative for abdominal pain  Endocrine: Negative for polyuria  Genitourinary: Positive for vaginal bleeding  Negative for dysuria  Musculoskeletal: Negative for gait problem  Skin: Negative for rash  Allergic/Immunologic: Positive for environmental allergies  Neurological: Positive for headaches  Negative for weakness and numbness  Psychiatric/Behavioral: Negative for confusion  All other systems reviewed and are negative  Physical Exam  ED Triage Vitals   Temperature Pulse Respirations Blood Pressure SpO2   02/10/23 1328 02/10/23 1327 02/10/23 1327 02/10/23 1327 02/10/23 1327   98 4 °F (36 9 °C) 71 17 134/65 100 %      Temp Source Heart Rate Source Patient Position - Orthostatic VS BP Location FiO2 (%)   02/10/23 1328 -- 02/10/23 1327 02/10/23 1327 --   Oral  Sitting Right arm       Pain Score       02/10/23 1424       6             Orthostatic Vital Signs  Vitals:    02/10/23 1327   BP: 134/65   Pulse: 71   Patient Position - Orthostatic VS: Sitting       Physical Exam  Vitals and nursing note reviewed  Constitutional:       General: She is not in acute distress  Appearance: Normal appearance  She is not ill-appearing, toxic-appearing or diaphoretic  Comments: Patient appears comfortable during my evaluation    HENT:      Head: Normocephalic and atraumatic        Right Ear: Tympanic membrane, ear canal and external ear normal  There is no impacted cerumen  Left Ear: Ear canal and external ear normal  There is no impacted cerumen  Ears:      Comments: Bulging and non-erythematous left TM     Nose: Nose normal  No rhinorrhea  Mouth/Throat:      Mouth: Mucous membranes are moist       Pharynx: Oropharynx is clear  No oropharyngeal exudate or posterior oropharyngeal erythema  Comments: Uvula midline  No oropharyngeal or submandibular mass/swelling  Eyes:      General: No scleral icterus  Right eye: No discharge  Left eye: No discharge  Extraocular Movements: Extraocular movements intact  Conjunctiva/sclera: Conjunctivae normal       Pupils: Pupils are equal, round, and reactive to light  Neck:      Comments: Patient is spontaneously rotating their neck to the left and right during the history and physical exam interaction without difficulty or apparent discomfort    Cardiovascular:      Rate and Rhythm: Normal rate and regular rhythm  Pulses: Normal pulses  Heart sounds: Normal heart sounds  No murmur heard  No friction rub  No gallop  Comments: 2+ Radial  Pulmonary:      Effort: Pulmonary effort is normal  No respiratory distress  Breath sounds: Normal breath sounds  No stridor  No wheezing, rhonchi or rales  Abdominal:      General: Abdomen is flat  There is no distension  Palpations: Abdomen is soft  Tenderness: There is no abdominal tenderness  There is no right CVA tenderness, left CVA tenderness, guarding or rebound  Musculoskeletal:         General: No deformity  Cervical back: Normal range of motion and neck supple  No rigidity or tenderness  No muscular tenderness  Lymphadenopathy:      Cervical: No cervical adenopathy  Skin:     General: Skin is warm and dry  Capillary Refill: Capillary refill takes less than 2 seconds  Neurological:      Mental Status: She is alert  Comments: Cranial nerves 2-12 intact    5/5 strength in all four extremities  Sensation to light touch intact in all four extremities  No pronator drift  Normal finger-to-nose  Patient able to ambulate without difficulty  No nystagmus  Saccade present with head impulse    Patient is speaking clearly in complete sentences  Patient is answering appropriately and able follow commands     Psychiatric:         Mood and Affect: Mood normal          Behavior: Behavior normal          ED Medications  Medications   fluticasone (FLONASE) 50 mcg/act nasal spray 1 spray (1 spray Each Nare Given 2/10/23 1406)   metoclopramide (REGLAN) injection 10 mg (10 mg Intravenous Given 2/10/23 1402)   loratadine (CLARITIN) tablet 10 mg (10 mg Oral Given 2/10/23 1406)   acetaminophen (TYLENOL) tablet 650 mg (650 mg Oral Given 2/10/23 1406)   meclizine (ANTIVERT) tablet 25 mg (25 mg Oral Given 2/10/23 1406)   sodium chloride 0 9 % bolus 500 mL (500 mL Intravenous New Bag 2/10/23 1401)   ketorolac (TORADOL) injection 15 mg (15 mg Intravenous Given 2/10/23 1424)       Diagnostic Studies  Results Reviewed     Procedure Component Value Units Date/Time    POCT pregnancy, urine [[id number]]  (Normal) Resulted: 02/10/23 1422    Lab Status: Final result Updated: 02/10/23 1422     EXT Preg Test, Ur Negative     Control Valid                 No orders to display         Procedures  Procedures      ED Course  ED Course as of 02/10/23 1445   Fri Feb 10, 2023   1436 Patient reports improvement of symptoms at this time    12 Patient has neither questions nor concerns after receiving discharge instructions and return precautions                                        Medical Decision Making  Patient is a 71-year-old female, with no pertinent medical history, who presents to the ED today with a 3-day history of atraumatic, gradual onset, bilateral frontal, constant, waxing and waning, moderate intensity, not further characterized headache that feels similar in character to prior headaches that the patient has had while on her menstrual period  Patient also reports associated vertiginous dizziness as well as nausea which she states are new during this episode  Patient reports vaginal bleeding stating that she is on her menstrual period  Patient denies associated fever, rash, weakness, numbness, dysphagia, vision change  Patient is currently afebrile and hemodynamically stable  Her physical exam is notable for saccade with head impulse testing as well as bulging without erythema left TM  This presentation is concerning for: Primary headache, peripheral vertigo, serous otitis media  Also considered pregnancy/pregnancy complication  No reason to suspect SAH, sinus thrombosis, mass lesion, meningitis at this time based on history and physical exam   Will investigate with pregnancy testing  Will manage with Flonase, Reglan, Toradol if pregnancy negative, loratadine, meclizine, further based upon work-up/response  - Patient is [place] Speaking  Hospital translation services used during the encounter    - History obtained from patient  - There are no limitations to the history obtained  - External record review performed of previous charting was performed by me revealing no pertinent medical history  - I independently reviewed and interpreted ordered labs from this encounter   - As patient has no PCP, I will refer to the [facility] family medicine clinic for follow up and establishment of care  Acute headache: acute illness or injury  Elevated blood pressure reading: acute illness or injury  Nausea: acute illness or injury  Serous otitis media: acute illness or injury  Vertigo: acute illness or injury  Amount and/or Complexity of Data Reviewed  Labs: ordered  Risk  OTC drugs  Prescription drug management              Disposition  Final diagnoses:   Acute headache   Serous otitis media   Nausea   Vertigo   Elevated blood pressure reading     Time reflects when diagnosis was documented in both MDM as applicable and the Disposition within this note     Time User Action Codes Description Comment    2/10/2023  1:52 PM Jessica Lachelle A Add [R51 9] Acute headache     2/10/2023  1:52 PM Jessica Lachelle A Add [H65 90] Serous otitis media     2/10/2023  1:52 PM Asia Rasheed A Add [R11 0] Nausea     2/10/2023  1:52 PM Jessica Lachelle A Add [R42] Vertigo     2/10/2023  2:03 PM Jessica Lachelle A Add [R03 0] Elevated blood pressure reading       ED Disposition     ED Disposition   Discharge    Condition   Stable    Date/Time   Fri Feb 10, 2023  2:36 PM    Comment   Hasmukh Espino discharge to home/self care                 Follow-up Information     Follow up With Specialties Details Why Contact Info Additional [address] [facility] Medicine Schedule an appointment as soon as possible for a visit   [address], [address] [facility] [address]  [address], [address], [address] [place], [place], [address] [facility]    [facility] Neurology Schedule an appointment as soon as possible for a visit   [address] [facility] [address] [facility]  [phone] [facility]rology [facility], [address] [place] [address], [address] [place] [address]          Patient's Medications   Discharge Prescriptions    FLUTICASONE (FLONASE) 50 MCG/ACT NASAL SPRAY    1 spray into each nostril daily       Start Date: 2/10/2023 End Date: --       Order Dose: 1 spray       Quantity: 16 g    Refills: 0    LORATADINE (CLARITIN) 10 MG TABLET    Take 1 tablet (10 mg total) by mouth daily       Start Date: 2/10/2023 End Date: --       Order Dose: 10 mg       Quantity: 20 tablet    Refills: 0    MECLIZINE (ANTIVERT) 25 MG TABLET    Take 1 tablet (25 mg total) by mouth 3 (three) times a day as needed for dizziness Start Date: 2/10/2023 End Date: --       Order Dose: 25 mg       Quantity: 30 tablet    Refills: 0    ONDANSETRON (ZOFRAN) 4 MG TABLET    Take 1 tablet (4 mg total) by mouth every 6 (six) hours       Start Date: 2/10/2023 End Date: --       Order Dose: 4 mg       Quantity: 12 tablet    Refills: 0         PDMP Review     None           ED Provider  Attending physically available and evaluated May Fitch I managed the patient along with the ED Attending      Electronically Signed by         Karla Cristina MD  02/10/23 8889

## 2023-03-09 ENCOUNTER — TELEPHONE (OUTPATIENT)
Dept: NEUROLOGY | Facility: CLINIC | Age: 23
End: 2023-03-09

## 2023-03-13 ENCOUNTER — CONSULT (OUTPATIENT)
Dept: NEUROLOGY | Facility: CLINIC | Age: 23
End: 2023-03-13

## 2023-03-13 VITALS — SYSTOLIC BLOOD PRESSURE: 120 MMHG | WEIGHT: 140 LBS | HEART RATE: 68 BPM | DIASTOLIC BLOOD PRESSURE: 70 MMHG

## 2023-03-13 DIAGNOSIS — G43.009 MIGRAINE WITHOUT AURA AND WITHOUT STATUS MIGRAINOSUS, NOT INTRACTABLE: Primary | ICD-10-CM

## 2023-03-13 DIAGNOSIS — R51.9 ACUTE HEADACHE: ICD-10-CM

## 2023-03-13 RX ORDER — AMITRIPTYLINE HYDROCHLORIDE 10 MG/1
10 TABLET, FILM COATED ORAL
Qty: 270 TABLET | Refills: 3 | Status: SHIPPED | OUTPATIENT
Start: 2023-03-13

## 2023-03-13 RX ORDER — RIZATRIPTAN BENZOATE 10 MG/1
10 TABLET ORAL AS NEEDED
Qty: 9 TABLET | Refills: 3 | Status: SHIPPED | OUTPATIENT
Start: 2023-03-13

## 2023-03-13 NOTE — PROGRESS NOTES
[address] [facility] Consult  PATIENT:  Brandin Aden  MRN:  [id number]  :  2000  DATE OF SERVICE:  3/13/2023  REFERRED BY: Kyleigh Leonardo*  PMD: No primary care provider on file  Assessment/Plan:     Brandin dAen is a very pleasant 21 y o  female with no significant PMHx who presents for evaluation of headaches  Migraines without aura  Preventative:  - we discussed headache hygiene and lifestyle factors that may improve headaches, including sleep hygiene and proper hydration  - start amitriptyline 10 mg daily for migraine prevention    Abortive:  - discussed not taking over-the-counter or prescription pain medications more than 3 days per week to prevent medication overuse/rebound headache  - start maxalt 10 mg PRN at onset of migraines  Can take zofran 4 mg PRN for associated nausea      CC:   headaches    History of Present Illness:     21 y o  female with no significant PMHx who presents for evaluation of headaches   [id number] utilized to assist in obtaining history  Headaches started at what age? 11  How often do the headaches occur?   - as of 3/13/2023: states that it occurs during her menstrual cycle, overall ~14 days per month  What time of the day do the headaches start? No particular time of day   How long do the headaches last? 3-4 days  Are you ever headache free? Yes    Aura? without aura     Where is your headache located and pain quality? What is the intensity of pain? Average: 8/10  Associated symptoms:   [x] Nausea       [] Vomiting        [] Diarrhea  [] Stiff or sore neck   [] Problems with concentration  [] Photophobia     []Phonophobia      [] Osmophobia  [] Blurred vision   [] Prefer quiet, dark room  [] Light-headed or dizzy     [] Tinnitus   [] Hands or feet tingle or feel numb/paresthesias      [] Ptosis      [] Facial droop  [] Lacrimation  [] Nasal congestion/rhinorrhea      Things that make the headache worse? No specific movements    Headache triggers: stress, menstruation    Have you seen someone else for headaches or pain? No  Have you had trigger point injection performed and how often? No  Have you had Botox injection performed and how often? No   Have you had epidural injections or transforaminal injections performed? No  Are you current pregnant or planning on getting pregnant? no  Have you ever had any Brain imaging? no    What medications do you take or have you taken for your headaches? ABORTIVE:    OTC medications have been ineffective   Acetaminophen - only when she has the headache    PREVENTIVE:   none    LIFESTYLE  Sleep   - averages: not sure  Problems falling asleep?:   No  Problems staying asleep?:  No    Water: not much  Caffeine: none    Mood:  Denies history of anxiety or depression or other diagnosed mood disorder    The following portions of the patient's history were reviewed and updated as appropriate: allergies, current medications, past family history, past medical history, past social history, past surgical history and problem list     Pertinent family history:  Family history of headaches:  no known family members with significant headaches  Any family history of aneurysms - No    Past Medical History:     Past Medical History:   Diagnosis Date   • Headache, tension-type        There is no problem list on file for this patient        Medications:      Current Outpatient Medications   Medication Sig Dispense Refill   • acetaminophen (TYLENOL) 500 mg tablet Take 1 tablet (500 mg total) by mouth every 6 (six) hours as needed for mild pain, moderate pain or fever 30 tablet 0   • benzonatate (TESSALON PERLES) 100 mg capsule Take 1 capsule (100 mg total) by mouth 3 (three) times a day as needed for cough 21 capsule 0   • fluticasone (FLONASE) 50 mcg/act nasal spray 1 spray into each nostril daily 16 g 0   • ibuprofen (MOTRIN) 400 mg tablet Take 1 tablet (400 mg total) by mouth every 6 (six) hours as needed for mild pain 20 tablet 0   • loratadine (CLARITIN) 10 mg tablet Take 1 tablet (10 mg total) by mouth daily 20 tablet 0   • meclizine (ANTIVERT) 25 mg tablet Take 1 tablet (25 mg total) by mouth 3 (three) times a day as needed for dizziness 30 tablet 0   • ondansetron (ZOFRAN) 4 mg tablet Take 1 tablet (4 mg total) by mouth every 6 (six) hours 12 tablet 0   • naproxen (EC NAPROSYN) 500 MG EC tablet Take 1 tablet (500 mg total) by mouth 2 (two) times a day with meals PRN Headache 20 tablet 0     No current facility-administered medications for this visit  Allergies: Allergies   Allergen Reactions   • Ampicillin Rash and Shortness Of Breath   • Penicillins Hives       Family History:     History reviewed  No pertinent family history  Social History:       Social History     Socioeconomic History   • Marital status: Single     Spouse name: Not on file   • Number of children: Not on file   • Years of education: Not on file   • Highest education level: Not on file   Occupational History   • Not on file   Tobacco Use   • Smoking status: Never   • Smokeless tobacco: Former   Substance and Sexual Activity   • Alcohol use: Never   • Drug use: Never   • Sexual activity: Not on file   Other Topics Concern   • Not on file   Social History Narrative   • Not on file     Social Determinants of Health     Financial Resource Strain: Not on file   Food Insecurity: Not on file   Transportation Needs: Not on file   Physical Activity: Not on file   Stress: Not on file   Social Connections: Not on file   Intimate Partner Violence: Not on file   Housing Stability: Not on file         Objective:   /70 (BP Location: Right arm, Patient Position: Sitting, Cuff Size: Adult)   Pulse 68   Wt 63 5 kg (140 lb)     General: Patient is not in any acute/apparent distress, well nourished, well developed and cooperative     HEENT: normocephalic, atraumatic, moist membranes  Neck: supple  Extremities: no edema noted Skin: no lesions or rash  Musculosketal: no bony abnormalities    Neurologic Examination:   Mental status: alert, awake, oriented X 3 and following commands  Speech/Language: Speech is fluent without any dysarthria, no aphasia noted, can name, repeat, read and write and comprehension intact    Cranial Nerves:   CN I: smell not tested  CN II: Visual fields full to confrontation  CN III, IV, VI: Extraocular movements intact bilaterally  Pupils equal round and reactive to light bilaterally  CN V: Facial sensation is normal   CN VII: Full and symmetric facial movement  CN VIII: Hearing is normal   CN IX, X: Palate elevates symmetrically  CN XI: Shoulder shrug strength is normal   CN XII: Tongue midline without atrophy or fasciculations  Motor:   Strength 5/5 in all 4 extremities  Bulk/tone - normal   Fasiculations - none    Sensory:   Sensation intact to soft touch in all 4 extremities  Cerebellar:   Finger-to-nose intact, normal heel to shin  Reflexes: 2+ in all 4 extremities    Gait:   Normal casual gait     Review of Systems:     Review of Systems   Constitutional: Negative  Negative for appetite change and fever  HENT: Negative  Negative for hearing loss, tinnitus, trouble swallowing and voice change  Eyes: Positive for redness  Negative for photophobia, pain and visual disturbance  Respiratory: Negative  Negative for shortness of breath  Cardiovascular: Negative  Negative for palpitations  Gastrointestinal: Negative  Negative for nausea and vomiting  Endocrine: Negative  Negative for cold intolerance  Genitourinary: Negative  Negative for dysuria, frequency and urgency  Musculoskeletal: Negative  Negative for gait problem, myalgias and neck pain  Skin: Negative  Negative for rash  Allergic/Immunologic: Negative  Neurological: Positive for headaches   Negative for dizziness, tremors, seizures, syncope, facial asymmetry, speech difficulty, weakness, light-headedness and numbness  Hematological: Negative  Does not bruise/bleed easily  Psychiatric/Behavioral: Negative  Negative for confusion, hallucinations and sleep disturbance  I have spent 33 minutes with Patient today in which greater than 50% of this time was spent in counseling/coordination of care regarding Risks and benefits of tx options, Instructions for management, Patient and family education, Risk factor reductions and Impressions  I also spent 10 minutes non face to face for this patient the same day

## 2023-03-13 NOTE — PATIENT INSTRUCTIONS
Patient instructions      Headache/migraine treatment:   Abortive medications (for immediate treatment of a headache): It is ok to take ibuprofen, acetaminophen or naproxen (Advil, Tylenol,  Aleve, Excedrin) if they help your headaches you should limit these to No more than 3 times a week to avoid medication overuse/rebound headaches  For your more moderate to severe migraines take this medication early   Maxalt (rizatriptan) 10mg tabs - take one at the onset of headache  May repeat one time after 1-2 hours if pain has not resolved  (Max 2 a day and 9 a month)     - some people may have some side effects from this medication, most typically do not  Common side effects include making you feel tired, palpitations, tingling or tightness of the face or chest   Most people report the side effects are nothing compared to their migraines and do not mind these  If you have intolerable side effects we will stop  Over the counter preventive supplements for headaches/migraines (if you try, try for 3 months straight)  (to take every day to help prevent headaches - not to take at the time of headache): There are combo pills online of these - none of which regulated by FDA and double check dosing - take appropriate dose only once a day- preventa migraine, migravent, mind ease, migrelief   [x] Magnesium 400mg daily (If any diarrhea or upset stomach, decrease dose  as tolerated)  [x] Riboflavin (Vitamin B2) 400mg daily - try online   (FYI B2 may make your urine bright/neon yellow)  AND/OR  [] Herbal medication: Petasites/Butterbur 150 mg daily - try online  (When choosing your Butterbur online or in the store, beware that there are some poor preps containing pyrrolizidine alkaloids (PAs) that can be harmful to the liver  Therefore, do not use butterbur products that are not labeled as PA-free )    Sleep and headache prevention:   [] Melatonin - you may take 3 mg nightly for sleep   You should take this 1 hour prior to bedtime consistently every night for it to work  It works by gradually helping to adjust your sleep time over days to weeks, rather than immediately making you feel sleepy  Prescription preventive medications for headaches/migraines   (to take every day to help prevent headaches - not to take at the time of headache):  [x] Amytriptyline start 10mg at bedtime  Increase by 10mg each week until good effect on headaches/pain or reach 50mg nightly     *Typically these types of medications take time untill you see the benefit, although some may see improvement in days, often it may take weeks, especially if the medication is being titrated up to a beneficial level  Please contact us if there are any concerns or questions regarding the medication  Lifestyle Recommendations:  [x] SLEEP - Maintain a regular sleep schedule: Adults need at least 7-8 hours of uninterrupted a night  Maintain good sleep hygiene:  Going to bed and waking up at consistent times, avoiding excessive daytime naps, avoiding caffeinated beverages in the evening, avoid excessive stimulation in the evening and generally using bed primarily for sleeping  One hour before bedtime would recommend turning lights down lower, decreasing your activity (may read quietly, listen to music at a low volume)  When you get into bed, should eliminate all technology (no texting, emailing, playing with your phone, iPad or tablet in bed)  [x] HYDRATION - Maintain good hydration  Drink  2L of fluid a day (4 typical small water bottles)  [x] DIET - Maintain good nutrition  In particular don't skip meals and try and eat healthy balanced meals regularly  [x] TRIGGERS - Look for other triggers and avoid them: Limit caffeine to 1-2 cups a day or less  Avoid dietary triggers that you have noticed bring on your headaches (this could include aged cheese, peanuts, MSG, aspartame and nitrates)    [x] EXERCISE - physical exercise as we all know is good for you in many ways, and not only is good for your heart, but also is beneficial for your mental health, cognitive health and  chronic pain/headaches  I would encourage at the least 5 days of physical exercise weekly for at least 30 minutes  Education and Follow-up  [x] Please call with any questions or concerns  Of course if any new concerning symptoms go to the emergency department    [x] Follow up in 4-5 months

## 2023-10-27 ENCOUNTER — OFFICE VISIT (OUTPATIENT)
Dept: NEUROLOGY | Facility: CLINIC | Age: 23
End: 2023-10-27
Payer: COMMERCIAL

## 2023-10-27 VITALS
DIASTOLIC BLOOD PRESSURE: 70 MMHG | WEIGHT: 151 LBS | SYSTOLIC BLOOD PRESSURE: 112 MMHG | HEIGHT: 61 IN | BODY MASS INDEX: 28.51 KG/M2 | HEART RATE: 68 BPM

## 2023-10-27 DIAGNOSIS — G43.009 MIGRAINE WITHOUT AURA AND WITHOUT STATUS MIGRAINOSUS, NOT INTRACTABLE: ICD-10-CM

## 2023-10-27 DIAGNOSIS — R11.0 NAUSEA: ICD-10-CM

## 2023-10-27 PROCEDURE — 99213 OFFICE O/P EST LOW 20 MIN: CPT | Performed by: STUDENT IN AN ORGANIZED HEALTH CARE EDUCATION/TRAINING PROGRAM

## 2023-10-27 RX ORDER — RIZATRIPTAN BENZOATE 10 MG/1
10 TABLET ORAL AS NEEDED
Qty: 9 TABLET | Refills: 3 | Status: SHIPPED | OUTPATIENT
Start: 2023-10-27

## 2023-10-27 RX ORDER — ONDANSETRON 4 MG/1
4 TABLET, FILM COATED ORAL EVERY 6 HOURS
Qty: 30 TABLET | Refills: 3 | Status: SHIPPED | OUTPATIENT
Start: 2023-10-27

## 2023-10-27 RX ORDER — AMITRIPTYLINE HYDROCHLORIDE 50 MG/1
25 TABLET, FILM COATED ORAL
Qty: 90 TABLET | Refills: 3 | Status: SHIPPED | OUTPATIENT
Start: 2023-10-27

## 2023-10-27 NOTE — PROGRESS NOTES
[facility]  PATIENT:  Sara Rey  MRN:  [id number]  :  2000  DATE OF SERVICE:  2023  REFERRED BY: No ref. provider found  PMD: No primary care provider on file. Assessment/Plan:     Sara Rey is a very pleasant 21 y.o. female with no significant PMHx who presents for evaluation of headaches. Migraines without aura  Preventative:  - we discussed headache hygiene and lifestyle factors that may improve headaches, including sleep hygiene and proper hydration  - restart amitriptyline and increase up to 25 mg qHS    Abortive:  - discussed not taking over-the-counter or prescription pain medications more than 3 days per week to prevent medication overuse/rebound headache  - continue maxalt 10 mg PRN and zofran PRN at the onset of migraines    CC:   headaches    History of Present Illness:     21 y.o. female with no significant PMHx who presents for evaluation of headaches.  utilized to assist in obtaining history. Interval Hx  She states that over the last several months she has been having more frequent migraines. Of note, she was previously taking amitriptyline 10 mg daily for migraine prevention. However, she ran out of the medication and did not request refills. Prior to several months ago when she ran out, she did derive some benefit from daily use. She did not uptitrate the medication per previous instructions. She does find maxalt helpful for relief when she does have a breakthrough headache. She is currently experiencing about 5-6 migraine days per month. Previous Hx  Headaches started at what age? 11  How often do the headaches occur?   - as of 2023: states that it occurs during her menstrual cycle, overall ~14 days per month  What time of the day do the headaches start? No particular time of day   How long do the headaches last? 3-4 days  Are you ever headache free?  Yes    Aura? without aura     Where is your headache located and pain quality? What is the intensity of pain? Average: 8/10  Associated symptoms:   [x] Nausea       [] Vomiting        [] Diarrhea  [] Stiff or sore neck   [] Problems with concentration  [] Photophobia     []Phonophobia      [] Osmophobia  [] Blurred vision   [] Prefer quiet, dark room  [] Light-headed or dizzy     [] Tinnitus   [] Hands or feet tingle or feel numb/paresthesias      [] Ptosis      [] Facial droop  [] Lacrimation  [] Nasal congestion/rhinorrhea      Things that make the headache worse? No specific movements    Headache triggers: stress, menstruation    Have you seen someone else for headaches or pain? No  Have you had trigger point injection performed and how often? No  Have you had Botox injection performed and how often? No   Have you had epidural injections or transforaminal injections performed? No  Are you current pregnant or planning on getting pregnant? no  Have you ever had any Brain imaging? no    What medications do you take or have you taken for your headaches? ABORTIVE:    OTC medications have been ineffective   Acetaminophen - only when she has the headache    PREVENTIVE:   none    LIFESTYLE  Sleep   - averages: not sure  Problems falling asleep?:   No  Problems staying asleep?:  No    Water: not much  Caffeine: none    Mood:  Denies history of anxiety or depression or other diagnosed mood disorder    The following portions of the patient's history were reviewed and updated as appropriate: allergies, current medications, past family history, past medical history, past social history, past surgical history and problem list.    Pertinent family history:  Family history of headaches:  no known family members with significant headaches  Any family history of aneurysms - No    Past Medical History:     Past Medical History:   Diagnosis Date    Headache, tension-type        There is no problem list on file for this patient.       Medications:      Current Outpatient Medications   Medication Sig Dispense Refill    acetaminophen (TYLENOL) 500 mg tablet Take 1 tablet (500 mg total) by mouth every 6 (six) hours as needed for mild pain, moderate pain or fever 30 tablet 0    amitriptyline (ELAVIL) 50 mg tablet Take 0.5 tablets (25 mg total) by mouth daily at bedtime After 1 week, can increase up to 1 tablet at bedtime 90 tablet 3    ondansetron (ZOFRAN) 4 mg tablet Take 1 tablet (4 mg total) by mouth every 6 (six) hours 30 tablet 3    rizatriptan (Maxalt) 10 mg tablet Take 1 tablet (10 mg total) by mouth as needed for migraine Take at the onset of migraine; if symptoms continue or return, may take another dose at least 2 hours after first dose. Take no more than 2 doses in a day or 9 doses a week 9 tablet 3    benzonatate (TESSALON PERLES) 100 mg capsule Take 1 capsule (100 mg total) by mouth 3 (three) times a day as needed for cough (Patient not taking: Reported on 10/27/2023) 21 capsule 0    fluticasone (FLONASE) 50 mcg/act nasal spray 1 spray into each nostril daily (Patient not taking: Reported on 10/27/2023) 16 g 0    ibuprofen (MOTRIN) 400 mg tablet Take 1 tablet (400 mg total) by mouth every 6 (six) hours as needed for mild pain (Patient not taking: Reported on 10/27/2023) 20 tablet 0    loratadine (CLARITIN) 10 mg tablet Take 1 tablet (10 mg total) by mouth daily (Patient not taking: Reported on 10/27/2023) 20 tablet 0    meclizine (ANTIVERT) 25 mg tablet Take 1 tablet (25 mg total) by mouth 3 (three) times a day as needed for dizziness (Patient not taking: Reported on 10/27/2023) 30 tablet 0    naproxen (EC NAPROSYN) 500 MG EC tablet Take 1 tablet (500 mg total) by mouth 2 (two) times a day with meals PRN Headache 20 tablet 0     No current facility-administered medications for this visit. Allergies:       Allergies   Allergen Reactions    Ampicillin Rash and Shortness Of Breath    Penicillins Hives       Family History:     No family history on file.    Social History:       Social History     Socioeconomic History    Marital status: Single     Spouse name: Not on file    Number of children: Not on file    Years of education: Not on file    Highest education level: Not on file   Occupational History    Not on file   Tobacco Use    Smoking status: Never    Smokeless tobacco: Former   Substance and Sexual Activity    Alcohol use: Never    Drug use: Never    Sexual activity: Not on file   Other Topics Concern    Not on file   Social History Narrative    Not on file     Social Determinants of Health     Financial Resource Strain: Not on file   Food Insecurity: Not on file   Transportation Needs: Not on file   Physical Activity: Not on file   Stress: Not on file   Social Connections: Not on file   Intimate Partner Violence: Not on file   Housing Stability: Not on file         Objective:   /70 (BP Location: Right arm, Patient Position: Sitting, Cuff Size: Standard)   Pulse 68   Ht 5' 1" (1.549 m)   Wt 68.5 kg (151 lb)   BMI 28.53 kg/m²     General: Patient is not in any acute/apparent distress, well nourished, well developed and cooperative. HEENT: normocephalic, atraumatic, moist membranes  Neck: supple  Extremities: no edema noted   Skin: no lesions or rash  Musculosketal: no bony abnormalities    Neurologic Examination:   Mental status: alert, awake, oriented X 3 and following commands. Speech/Language: Speech is fluent without any dysarthria, no aphasia noted, comprehension intact    Cranial Nerves:   CN I: smell not tested  CN II: Visual fields full to confrontation  CN III, IV, VI: Extraocular movements intact bilaterally. Pupils equal round and reactive to light bilaterally. CN V: Facial sensation is normal.  CN VII: Full and symmetric facial movement. CN VIII: Hearing is normal.  CN IX, X: Palate elevates symmetrically. CN XI: Shoulder shrug strength is normal.  CN XII: Tongue midline without atrophy or fasciculations.     Motor: Strength 5/5 in all 4 extremities. Bulk/tone - normal.  Fasiculations - none    Sensory:   Sensation intact to soft touch in all 4 extremities. Cerebellar:   Finger-to-nose intact, normal heel to shin. Reflexes: 2+ in all 4 extremities    Gait:   Normal casual gait     Review of Systems:     ROS:    Review of Systems   Constitutional:  Negative for appetite change, fatigue and fever. HENT: Negative. Negative for hearing loss, tinnitus, trouble swallowing and voice change. Eyes: Negative. Negative for photophobia, pain and visual disturbance. Respiratory: Negative. Negative for shortness of breath. Cardiovascular: Negative. Negative for palpitations. Gastrointestinal:  Positive for nausea. Negative for vomiting. Endocrine: Negative. Negative for cold intolerance. Genitourinary: Negative. Negative for dysuria, frequency and urgency. Musculoskeletal:  Negative for back pain, gait problem, myalgias and neck pain. Skin: Negative. Negative for rash. Allergic/Immunologic: Negative. Neurological:  Positive for headaches. Negative for dizziness, tremors, seizures, syncope, facial asymmetry, speech difficulty, weakness, light-headedness and numbness. Hematological: Negative. Does not bruise/bleed easily. Psychiatric/Behavioral: Negative. Negative for confusion, hallucinations and sleep disturbance. I have spent a total time of 27 minutes on 10/27/23 in caring for this patient including Risks and benefits of tx options, Instructions for management, Impressions, Documenting in the medical record, and Obtaining or reviewing history  .

## 2023-10-27 NOTE — PATIENT INSTRUCTIONS
Patient Instructions:  -continue maxalt 10 mg as needed at the onset of migraines.  Take zofran 4 mg as needed for nausea  -increase amitriptyline up to 25 mg at bedtime  -follow up in 5-6 months

## 2024-01-07 ENCOUNTER — HOSPITAL ENCOUNTER (EMERGENCY)
Facility: HOSPITAL | Age: 24
Discharge: HOME | End: 2024-01-07
Attending: EMERGENCY MEDICINE

## 2024-01-07 VITALS
RESPIRATION RATE: 18 BRPM | HEART RATE: 97 BPM | WEIGHT: 149.91 LBS | OXYGEN SATURATION: 98 % | TEMPERATURE: 98.1 F | SYSTOLIC BLOOD PRESSURE: 117 MMHG | BODY MASS INDEX: 28.33 KG/M2 | DIASTOLIC BLOOD PRESSURE: 71 MMHG

## 2024-01-07 DIAGNOSIS — R11.2 NAUSEA & VOMITING: Primary | ICD-10-CM

## 2024-01-07 DIAGNOSIS — R51.9 HEADACHE: ICD-10-CM

## 2024-01-07 LAB
ALBUMIN SERPL BCP-MCNC: 4.3 G/DL (ref 3.5–5)
ALP SERPL-CCNC: 93 U/L (ref 34–104)
ALT SERPL W P-5'-P-CCNC: 99 U/L (ref 7–52)
ANION GAP SERPL CALCULATED.3IONS-SCNC: 7 MMOL/L
AST SERPL W P-5'-P-CCNC: 83 U/L (ref 13–39)
BACTERIA UR QL AUTO: ABNORMAL /HPF
BASOPHILS # BLD AUTO: 0.05 THOUSANDS/ÂΜL (ref 0–0.1)
BASOPHILS NFR BLD AUTO: 0 % (ref 0–1)
BILIRUB DIRECT SERPL-MCNC: 0.13 MG/DL (ref 0–0.2)
BILIRUB SERPL-MCNC: 0.51 MG/DL (ref 0.2–1)
BILIRUB UR QL STRIP: NEGATIVE
BUN SERPL-MCNC: 14 MG/DL (ref 5–25)
CALCIUM SERPL-MCNC: 9.3 MG/DL (ref 8.4–10.2)
CHLORIDE SERPL-SCNC: 107 MMOL/L (ref 96–108)
CLARITY UR: CLEAR
CO2 SERPL-SCNC: 24 MMOL/L (ref 21–32)
COLOR UR: YELLOW
CREAT SERPL-MCNC: 0.62 MG/DL (ref 0.6–1.3)
EOSINOPHIL # BLD AUTO: 0.08 THOUSAND/ÂΜL (ref 0–0.61)
EOSINOPHIL NFR BLD AUTO: 1 % (ref 0–6)
ERYTHROCYTE [DISTWIDTH] IN BLOOD BY AUTOMATED COUNT: 12.6 % (ref 11.6–15.1)
EXT PREGNANCY TEST URINE: NEGATIVE
EXT. CONTROL: NORMAL
GFR SERPL CREATININE-BSD FRML MDRD: 127 ML/MIN/1.73SQ M
GLUCOSE SERPL-MCNC: 108 MG/DL (ref 65–140)
GLUCOSE UR STRIP-MCNC: NEGATIVE MG/DL
HCT VFR BLD AUTO: 43.9 % (ref 34.8–46.1)
HGB BLD-MCNC: 15.1 G/DL (ref 11.5–15.4)
HGB UR QL STRIP.AUTO: NEGATIVE
IMM GRANULOCYTES # BLD AUTO: 0.06 THOUSAND/UL (ref 0–0.2)
IMM GRANULOCYTES NFR BLD AUTO: 0 % (ref 0–2)
KETONES UR STRIP-MCNC: NEGATIVE MG/DL
LEUKOCYTE ESTERASE UR QL STRIP: NEGATIVE
LIPASE SERPL-CCNC: 14 U/L (ref 11–82)
LYMPHOCYTES # BLD AUTO: 1.34 THOUSANDS/ÂΜL (ref 0.6–4.47)
LYMPHOCYTES NFR BLD AUTO: 8 % (ref 14–44)
MCH RBC QN AUTO: 31.1 PG (ref 26.8–34.3)
MCHC RBC AUTO-ENTMCNC: 34.4 G/DL (ref 31.4–37.4)
MCV RBC AUTO: 91 FL (ref 82–98)
MONOCYTES # BLD AUTO: 0.87 THOUSAND/ÂΜL (ref 0.17–1.22)
MONOCYTES NFR BLD AUTO: 5 % (ref 4–12)
MUCOUS THREADS UR QL AUTO: ABNORMAL
NEUTROPHILS # BLD AUTO: 14.07 THOUSANDS/ÂΜL (ref 1.85–7.62)
NEUTS SEG NFR BLD AUTO: 86 % (ref 43–75)
NITRITE UR QL STRIP: NEGATIVE
NON-SQ EPI CELLS URNS QL MICRO: ABNORMAL /HPF
NRBC BLD AUTO-RTO: 0 /100 WBCS
PH UR STRIP.AUTO: 6 [PH] (ref 4.5–8)
PLATELET # BLD AUTO: 332 THOUSANDS/UL (ref 149–390)
PMV BLD AUTO: 9.1 FL (ref 8.9–12.7)
POTASSIUM SERPL-SCNC: 3.5 MMOL/L (ref 3.5–5.3)
PROT SERPL-MCNC: 7.3 G/DL (ref 6.4–8.4)
PROT UR STRIP-MCNC: ABNORMAL MG/DL
RBC # BLD AUTO: 4.85 MILLION/UL (ref 3.81–5.12)
RBC #/AREA URNS AUTO: ABNORMAL /HPF
SODIUM SERPL-SCNC: 138 MMOL/L (ref 135–147)
SP GR UR STRIP.AUTO: >=1.03 (ref 1–1.03)
UROBILINOGEN UR QL STRIP.AUTO: 1 E.U./DL
WBC # BLD AUTO: 16.47 THOUSAND/UL (ref 4.31–10.16)
WBC #/AREA URNS AUTO: ABNORMAL /HPF

## 2024-01-07 PROCEDURE — 80048 BASIC METABOLIC PNL TOTAL CA: CPT | Performed by: EMERGENCY MEDICINE

## 2024-01-07 PROCEDURE — 36415 COLL VENOUS BLD VENIPUNCTURE: CPT | Performed by: EMERGENCY MEDICINE

## 2024-01-07 PROCEDURE — 80076 HEPATIC FUNCTION PANEL: CPT | Performed by: EMERGENCY MEDICINE

## 2024-01-07 PROCEDURE — 85025 COMPLETE CBC W/AUTO DIFF WBC: CPT | Performed by: EMERGENCY MEDICINE

## 2024-01-07 PROCEDURE — 96374 THER/PROPH/DIAG INJ IV PUSH: CPT

## 2024-01-07 PROCEDURE — 81001 URINALYSIS AUTO W/SCOPE: CPT

## 2024-01-07 PROCEDURE — 96375 TX/PRO/DX INJ NEW DRUG ADDON: CPT

## 2024-01-07 PROCEDURE — 96361 HYDRATE IV INFUSION ADD-ON: CPT

## 2024-01-07 PROCEDURE — 99283 EMERGENCY DEPT VISIT LOW MDM: CPT

## 2024-01-07 PROCEDURE — 81025 URINE PREGNANCY TEST: CPT | Performed by: EMERGENCY MEDICINE

## 2024-01-07 PROCEDURE — 99284 EMERGENCY DEPT VISIT MOD MDM: CPT | Performed by: EMERGENCY MEDICINE

## 2024-01-07 PROCEDURE — 83690 ASSAY OF LIPASE: CPT | Performed by: EMERGENCY MEDICINE

## 2024-01-07 RX ORDER — METOCLOPRAMIDE HYDROCHLORIDE 5 MG/ML
10 INJECTION INTRAMUSCULAR; INTRAVENOUS ONCE
Status: COMPLETED | OUTPATIENT
Start: 2024-01-07 | End: 2024-01-07

## 2024-01-07 RX ORDER — KETOROLAC TROMETHAMINE 30 MG/ML
15 INJECTION, SOLUTION INTRAMUSCULAR; INTRAVENOUS ONCE
Status: COMPLETED | OUTPATIENT
Start: 2024-01-07 | End: 2024-01-07

## 2024-01-07 RX ORDER — METOCLOPRAMIDE 10 MG/1
10 TABLET ORAL EVERY 6 HOURS
Qty: 30 TABLET | Refills: 0 | Status: SHIPPED | OUTPATIENT
Start: 2024-01-07

## 2024-01-07 RX ORDER — NAPROXEN 500 MG/1
500 TABLET ORAL 2 TIMES DAILY WITH MEALS
Qty: 20 TABLET | Refills: 0 | Status: SHIPPED | OUTPATIENT
Start: 2024-01-07 | End: 2024-01-17

## 2024-01-07 RX ORDER — DIPHENHYDRAMINE HYDROCHLORIDE 50 MG/ML
25 INJECTION INTRAMUSCULAR; INTRAVENOUS ONCE
Status: COMPLETED | OUTPATIENT
Start: 2024-01-07 | End: 2024-01-07

## 2024-01-07 RX ADMIN — METOCLOPRAMIDE 10 MG: 5 INJECTION, SOLUTION INTRAMUSCULAR; INTRAVENOUS at 06:43

## 2024-01-07 RX ADMIN — SODIUM CHLORIDE 1000 ML: 0.9 INJECTION, SOLUTION INTRAVENOUS at 06:42

## 2024-01-07 RX ADMIN — DIPHENHYDRAMINE HYDROCHLORIDE 25 MG: 50 INJECTION, SOLUTION INTRAMUSCULAR; INTRAVENOUS at 06:42

## 2024-01-07 RX ADMIN — KETOROLAC TROMETHAMINE 15 MG: 30 INJECTION, SOLUTION INTRAMUSCULAR; INTRAVENOUS at 07:36

## 2024-01-07 NOTE — ED PROVIDER NOTES
History  Chief Complaint   Patient presents with    Vomiting     Pt reports vomiting since 12pm, pt also reports dizziness -blurry vision, +HA. Pt reports abdominal pain only occurring when vomiting. -No med pta. -fevers and chills     22 YO female presents with nausea, vomiting and headache. States this began overnight, she has had multiple episodes of emesis. Patient has history of headaches in the past, states this is no too severe but nausea has been bad. She denies fevers, no flu like symptoms. She has felt some dizziness. Patient has generalized abdominal pain with vomiting but denies this currently. Pt denies CP/SOB/F/C/D/C, no dysuria, burning on urination or blood in urine.       History provided by:  Patient   used: No        Prior to Admission Medications   Prescriptions Last Dose Informant Patient Reported? Taking?   acetaminophen (TYLENOL) 500 mg tablet Not Taking  No No   Sig: Take 1 tablet (500 mg total) by mouth every 6 (six) hours as needed for mild pain, moderate pain or fever   Patient not taking: Reported on 2024   amitriptyline (ELAVIL) 50 mg tablet Not Taking  No No   Sig: Take 0.5 tablets (25 mg total) by mouth daily at bedtime After 1 week, can increase up to 1 tablet at bedtime   Patient not taking: Reported on 2024   benzonatate (TESSALON PERLES) 100 mg capsule Not Taking  No No   Sig: Take 1 capsule (100 mg total) by mouth 3 (three) times a day as needed for cough   Patient not taking: Reported on 10/27/2023   fluticasone (FLONASE) 50 mcg/act nasal spray Not Taking  No No   Si spray into each nostril daily   Patient not taking: Reported on 10/27/2023   ibuprofen (MOTRIN) 400 mg tablet Not Taking  No No   Sig: Take 1 tablet (400 mg total) by mouth every 6 (six) hours as needed for mild pain   Patient not taking: Reported on 10/27/2023   loratadine (CLARITIN) 10 mg tablet Not Taking  No No   Sig: Take 1 tablet (10 mg total) by mouth daily   Patient not taking:  Reported on 10/27/2023   meclizine (ANTIVERT) 25 mg tablet Not Taking  No No   Sig: Take 1 tablet (25 mg total) by mouth 3 (three) times a day as needed for dizziness   Patient not taking: Reported on 10/27/2023   naproxen (EC NAPROSYN) 500 MG EC tablet   No No   Sig: Take 1 tablet (500 mg total) by mouth 2 (two) times a day with meals PRN Headache   ondansetron (ZOFRAN) 4 mg tablet Not Taking  No No   Sig: Take 1 tablet (4 mg total) by mouth every 6 (six) hours   Patient not taking: Reported on 1/7/2024   rizatriptan (Maxalt) 10 mg tablet Not Taking  No No   Sig: Take 1 tablet (10 mg total) by mouth as needed for migraine Take at the onset of migraine; if symptoms continue or return, may take another dose at least 2 hours after first dose. Take no more than 2 doses in a day or 9 doses a week   Patient not taking: Reported on 1/7/2024      Facility-Administered Medications: None       Past Medical History:   Diagnosis Date    Headache, tension-type        History reviewed. No pertinent surgical history.    History reviewed. No pertinent family history.  I have reviewed and agree with the history as documented.    E-Cigarette/Vaping     E-Cigarette/Vaping Substances     Social History     Tobacco Use    Smoking status: Never    Smokeless tobacco: Former   Substance Use Topics    Alcohol use: Never    Drug use: Never       Review of Systems   Constitutional:  Negative for chills, fatigue and fever.   HENT:  Negative for dental problem.    Eyes:  Negative for visual disturbance.   Respiratory:  Negative for shortness of breath.    Cardiovascular:  Negative for chest pain.   Gastrointestinal:  Positive for abdominal pain, nausea and vomiting. Negative for diarrhea.   Genitourinary:  Negative for dysuria and frequency.   Musculoskeletal:  Negative for arthralgias.   Skin:  Negative for rash.   Neurological:  Positive for dizziness. Negative for weakness and light-headedness.   Psychiatric/Behavioral:  Negative for  agitation, behavioral problems and confusion.    All other systems reviewed and are negative.      Physical Exam  Physical Exam  Vitals and nursing note reviewed.   Constitutional:       Appearance: Normal appearance.   HENT:      Head: Normocephalic and atraumatic.   Eyes:      Extraocular Movements: Extraocular movements intact.      Conjunctiva/sclera: Conjunctivae normal.   Cardiovascular:      Rate and Rhythm: Normal rate and regular rhythm.      Pulses: Normal pulses.      Heart sounds: Normal heart sounds.   Pulmonary:      Effort: Pulmonary effort is normal.   Abdominal:      General: Abdomen is flat. There is no distension.      Tenderness: There is no abdominal tenderness.   Musculoskeletal:         General: Normal range of motion.      Cervical back: Normal range of motion.   Skin:     Findings: No rash.   Neurological:      General: No focal deficit present.      Mental Status: She is alert.      Cranial Nerves: No cranial nerve deficit.   Psychiatric:         Mood and Affect: Mood normal.         Vital Signs  ED Triage Vitals [01/07/24 0626]   Temperature Pulse Respirations Blood Pressure SpO2   98.1 °F (36.7 °C) 97 18 117/71 98 %      Temp Source Heart Rate Source Patient Position - Orthostatic VS BP Location FiO2 (%)   Oral Monitor Sitting Right arm --      Pain Score       7           Vitals:    01/07/24 0626   BP: 117/71   Pulse: 97   Patient Position - Orthostatic VS: Sitting         Visual Acuity  Visual Acuity      Flowsheet Row Most Recent Value   L Pupil Size (mm) 4   R Pupil Size (mm) 4            ED Medications  Medications   sodium chloride 0.9 % bolus 1,000 mL (0 mL Intravenous Stopped 1/7/24 0847)   metoclopramide (REGLAN) injection 10 mg (10 mg Intravenous Given 1/7/24 0643)   ketorolac (TORADOL) injection 15 mg (15 mg Intravenous Given 1/7/24 0736)   diphenhydrAMINE (BENADRYL) injection 25 mg (25 mg Intravenous Given 1/7/24 0642)       Diagnostic Studies  Results Reviewed       Procedure  Component Value Units Date/Time    Urine Microscopic [[id number]]  (Abnormal) Collected: 01/07/24 0729    Lab Status: Final result Specimen: Urine, Clean Catch Updated: 01/07/24 0807     RBC, UA 1-2 /hpf      WBC, UA 1-2 /hpf      Epithelial Cells Occasional /hpf      Bacteria, UA Occasional /hpf      MUCUS THREADS Moderate    POCT pregnancy, urine [[id number]]  (Normal) Resulted: 01/07/24 0732    Lab Status: Final result Updated: 01/07/24 0732     EXT Preg Test, Ur Negative     Control Valid    Urine Macroscopic, POC [[id number]]  (Abnormal) Collected: 01/07/24 0729    Lab Status: Final result Specimen: Urine Updated: 01/07/24 0731     Color, UA Yellow     Clarity, UA Clear     pH, UA 6.0     Leukocytes, UA Negative     Nitrite, UA Negative     Protein, UA Trace mg/dl      Glucose, UA Negative mg/dl      Ketones, UA Negative mg/dl      Urobilinogen, UA 1.0 E.U./dl      Bilirubin, UA Negative     Occult Blood, UA Negative     Specific Gravity, UA >=1.030    Narrative:      CLINITEK RESULT    Hepatic function panel [[id number]]  (Abnormal) Collected: 01/07/24 0640    Lab Status: Final result Specimen: Blood from Arm, Right Updated: 01/07/24 0726     Total Bilirubin 0.51 mg/dL      Bilirubin, Direct 0.13 mg/dL      Alkaline Phosphatase 93 U/L      AST 83 U/L      ALT 99 U/L      Total Protein 7.3 g/dL      Albumin 4.3 g/dL     Lipase [[id number]]  (Normal) Collected: 01/07/24 0640    Lab Status: Final result Specimen: Blood from Arm, Right Updated: 01/07/24 0726     Lipase 14 u/L     Basic metabolic panel [[id number]] Collected: 01/07/24 0640    Lab Status: Final result Specimen: Blood from Arm, Right Updated: 01/07/24 0726     Sodium 138 mmol/L      Potassium 3.5 mmol/L      Chloride 107 mmol/L      CO2 24 mmol/L      ANION GAP 7 mmol/L      BUN 14 mg/dL      Creatinine 0.62 mg/dL      Glucose 108 mg/dL      Calcium 9.3 mg/dL      eGFR 127 ml/min/1.73sq m     Narrative:      National Kid[employer] guidelines  for Chronic Kidney Disease (CKD):     Stage 1 with normal or high GFR (GFR > 90 mL/min/1.73 square meters)    Stage 2 Mild CKD (GFR = 60-89 mL/min/1.73 square meters)    Stage 3A Moderate CKD (GFR = 45-59 mL/min/1.73 square meters)    Stage 3B Moderate CKD (GFR = 30-44 mL/min/1.73 square meters)    Stage 4 Severe CKD (GFR = 15-29 mL/min/1.73 square meters)    Stage 5 End Stage CKD (GFR <15 mL/min/1.73 square meters)  Note: GFR calculation is accurate only with a steady state creatinine    CBC and differential [[id number]]  (Abnormal) Collected: 01/07/24 0640    Lab Status: Final result Specimen: Blood from Arm, Right Updated: 01/07/24 0652     WBC 16.47 Thousand/uL      RBC 4.85 Million/uL      Hemoglobin 15.1 g/dL      Hematocrit 43.9 %      MCV 91 fL      MCH 31.1 pg      MCHC 34.4 g/dL      RDW 12.6 %      MPV 9.1 fL      Platelets 332 Thousands/uL      nRBC 0 /100 WBCs      Neutrophils Relative 86 %      Immat GRANS % 0 %      Lymphocytes Relative 8 %      Monocytes Relative 5 %      Eosinophils Relative 1 %      Basophils Relative 0 %      Neutrophils Absolute 14.07 Thousands/µL      Immature Grans Absolute 0.06 Thousand/uL      Lymphocytes Absolute 1.34 Thousands/µL      Monocytes Absolute 0.87 Thousand/µL      Eosinophils Absolute 0.08 Thousand/µL      Basophils Absolute 0.05 Thousands/µL                    No orders to display              Procedures  Procedures         ED Course  ED Course as of 01/07/24 0929   Sun Jan 07, 2024   0801 Patient states she is feeling better after medications, ok with discharge. Explained elevated WBC and likely due to patient's vomiting.   0802 WBC(!): 16.47                               SBIRT 20yo+      Flowsheet Row Most Recent Value   Initial Alcohol Screen: US AUDIT-C     1. How often do you have a drink containing alcohol? 0 Filed at: 01/07/2024 0636   2. How many drinks containing alcohol do you have on a typical day you are drinking?  0 Filed at: 01/07/2024 0636   3a.  Male UNDER 65: How often do you have five or more drinks on one occasion? 0 Filed at: 01/07/2024 0636   3b. FEMALE Any Age, or MALE 65+: How often do you have 4 or more drinks on one occassion? 0 Filed at: 01/07/2024 0636   Audit-C Score 0 Filed at: 01/07/2024 0636   SAMSON: How many times in the past year have you...    Used an illegal drug or used a prescription medication for non-medical reasons? Never Filed at: 01/07/2024 0636                      Medical Decision Making  1. Headache - Patient with headaches in the past. Will give fluids, Reglan, Toradol, Benadryl, reassess.    2. Nausea/vomiting - Will check urine for infection and pregnancy, CBC for leukocytosis, metabolic panel for electrolyte abnormalities and dehydration,  LFT's to assess GB dysfunction, lipase for pancreatitis.     Amount and/or Complexity of Data Reviewed  Labs: ordered. Decision-making details documented in ED Course.    Risk  Prescription drug management.             Disposition  Final diagnoses:   Nausea & vomiting   Headache     Time reflects when diagnosis was documented in both MDM as applicable and the Disposition within this note       Time User Action Codes Description Comment    1/7/2024  8:02 AM Aashish Ragland Add [R11.2] Nausea & vomiting     1/7/2024  8:02 AM Aashish Ragland Add [R51.9] Headache           ED Disposition       ED Disposition   Discharge    Condition   Stable    Date/Time   Sun Jan 7, 2024 0802    Comment   Shikha Borrero discharge to home/self care.                   Follow-up Information    None         Discharge Medication List as of 1/7/2024  8:03 AM        START taking these medications    Details   metoclopramide (REGLAN) 10 mg tablet Take 1 tablet (10 mg total) by mouth every 6 (six) hours, Starting Sun 1/7/2024, Normal      naproxen (NAPROSYN) 500 mg tablet Take 1 tablet (500 mg total) by mouth 2 (two) times a day with meals for 10 days, Starting Sun 1/7/2024, Until Wed 1/17/2024, Normal            CONTINUE these medications which have NOT CHANGED    Details   acetaminophen (TYLENOL) 500 mg tablet Take 1 tablet (500 mg total) by mouth every 6 (six) hours as needed for mild pain, moderate pain or fever, Starting Wed 12/28/2022, Normal      amitriptyline (ELAVIL) 50 mg tablet Take 0.5 tablets (25 mg total) by mouth daily at bedtime After 1 week, can increase up to 1 tablet at bedtime, Starting Fri 10/27/2023, Normal      benzonatate (TESSALON PERLES) 100 mg capsule Take 1 capsule (100 mg total) by mouth 3 (three) times a day as needed for cough, Starting Wed 12/28/2022, Normal      fluticasone (FLONASE) 50 mcg/act nasal spray 1 spray into each nostril daily, Starting Fri 2/10/2023, Normal      ibuprofen (MOTRIN) 400 mg tablet Take 1 tablet (400 mg total) by mouth every 6 (six) hours as needed for mild pain, Starting Wed 12/28/2022, Normal      loratadine (CLARITIN) 10 mg tablet Take 1 tablet (10 mg total) by mouth daily, Starting Fri 2/10/2023, Normal      meclizine (ANTIVERT) 25 mg tablet Take 1 tablet (25 mg total) by mouth 3 (three) times a day as needed for dizziness, Starting Fri 2/10/2023, Normal      naproxen (EC NAPROSYN) 500 MG EC tablet Take 1 tablet (500 mg total) by mouth 2 (two) times a day with meals PRN Headache, Starting Thu 7/15/2021, Until Fri 7/15/2022, Print      ondansetron (ZOFRAN) 4 mg tablet Take 1 tablet (4 mg total) by mouth every 6 (six) hours, Starting Fri 10/27/2023, Normal      rizatriptan (Maxalt) 10 mg tablet Take 1 tablet (10 mg total) by mouth as needed for migraine Take at the onset of migraine; if symptoms continue or return, may take another dose at least 2 hours after first dose. Take no more than 2 doses in a day or 9 doses a week, Starting Fri 10/27/2 023, Normal             No discharge procedures on file.    PDMP Review       None            ED Provider  Electronically Signed by             Aashish Ragland MD  01/07/24 5540

## 2024-01-07 NOTE — Clinical Note
Shikha Borrero was seen and treated in our emergency department on 1/7/2024.    No restrictions            Diagnosis:     Shikha  may return to work on return date.    She may return on this date: 01/08/2024         If you have any questions or concerns, please don't hesitate to call.      Aashish Ragland MD    ______________________________           _______________          _______________  Hospital Representative                              Date                                Time

## 2024-01-07 NOTE — DISCHARGE INSTRUCTIONS
Take the Naprosyn twice daily for the next 5-10 days.     You can take the Reglan as needed if your headache worsens.

## 2024-02-15 ENCOUNTER — OFFICE VISIT (OUTPATIENT)
Dept: OBGYN CLINIC | Facility: CLINIC | Age: 24
End: 2024-02-15

## 2024-02-15 VITALS
HEART RATE: 76 BPM | SYSTOLIC BLOOD PRESSURE: 101 MMHG | BODY MASS INDEX: 29.07 KG/M2 | WEIGHT: 154 LBS | DIASTOLIC BLOOD PRESSURE: 71 MMHG | HEIGHT: 61 IN

## 2024-02-15 DIAGNOSIS — R10.2 PELVIC PAIN: Primary | ICD-10-CM

## 2024-02-15 DIAGNOSIS — M54.40 ACUTE BILATERAL LOW BACK PAIN WITH SCIATICA, SCIATICA LATERALITY UNSPECIFIED: ICD-10-CM

## 2024-02-15 PROCEDURE — 99213 OFFICE O/P EST LOW 20 MIN: CPT | Performed by: NURSE PRACTITIONER

## 2024-02-15 NOTE — PROGRESS NOTES
"PROBLEM GYNECOLOGICAL VISIT    Shikha Borrero is a 24 y.o. female who presents today with complaint of low back and pelvic pain.  Her general medical history has been reviewed and she reports it as follows:    Past Medical History:   Diagnosis Date    Headache, tension-type      History reviewed. No pertinent surgical history.  OB History          0    Para   0    Term   0       0    AB   0    Living   0         SAB   0    IAB   0    Ectopic   0    Multiple   0    Live Births   0               Social History     Tobacco Use    Smoking status: Never    Smokeless tobacco: Never   Vaping Use    Vaping status: Never Used   Substance Use Topics    Alcohol use: Never    Drug use: Never     Social History     Substance and Sexual Activity   Sexual Activity Yes    Partners: Male    Birth control/protection: I.U.D.       Current Outpatient Medications   Medication Instructions    amitriptyline (ELAVIL) 25 mg, Oral, Daily at bedtime, After 1 week, can increase up to 1 tablet at bedtime    naproxen (NAPROSYN) 500 mg, Oral, 2 times daily with meals    ondansetron (ZOFRAN) 4 mg, Oral, Every 6 hours    rizatriptan (MAXALT) 10 mg, Oral, As needed, Take at the onset of migraine; if symptoms continue or return, may take another dose at least 2 hours after first dose. Take no more than 2 doses in a day or 9 doses a week       History of Present Illness:   Patient presents for one and a half weeks of lower back pain radiating to lower abdomen. Describes pain in \"uterus.\" Denies urinary s/s, vaginal bleeding, vaginal discharge, itching or irritation. Denies lower back injury. Patient is concerned it is the IUD causing the pelvic pain.     Review of Systems:  Review of Systems   Constitutional: Negative.    Gastrointestinal: Negative.    Genitourinary:  Positive for pelvic pain.   Psychiatric/Behavioral: Negative.         Physical Exam:  /71   Pulse 76   Ht 5' 1\" (1.549 m)   Wt 69.9 kg (154 lb)   BMI " 29.10 kg/m²   Physical Exam  Constitutional:       Appearance: Normal appearance.   Genitourinary:      Vulva normal.      No lesions in the vagina.      No cervical discharge or lesion.      IUD strings visualized.   Abdominal:      General: Abdomen is flat.      Palpations: Abdomen is soft.   Neurological:      Mental Status: She is alert.   Skin:     General: Skin is warm and dry.   Psychiatric:         Mood and Affect: Mood normal.         Behavior: Behavior normal.   Vitals and nursing note reviewed.         Assessment:   1. Pelvic Pain    Plan:   1. IUD string noted at cervix.   2. Will obtain transvaginal ultrasound for pelvic pain.     Reviewed with patient that test results are available in [facility] immediately, but that they will not necessarily be reviewed by me immediately.  Explained that I will review results at my earliest opportunity and contact patient appropriately.

## 2024-02-23 ENCOUNTER — HOSPITAL ENCOUNTER (OUTPATIENT)
Dept: ULTRASOUND IMAGING | Facility: HOSPITAL | Age: 24
End: 2024-02-23
Payer: COMMERCIAL

## 2024-02-23 DIAGNOSIS — R10.2 PELVIC PAIN: ICD-10-CM

## 2024-02-23 PROCEDURE — 76856 US EXAM PELVIC COMPLETE: CPT

## 2024-02-23 PROCEDURE — 76830 TRANSVAGINAL US NON-OB: CPT

## 2024-02-28 NOTE — RESULT ENCOUNTER NOTE
Spoke with patient to confirm us findings and to advise IUD is in place. Patient stated understanding and had no further questions.

## 2024-03-29 ENCOUNTER — ANNUAL EXAM (OUTPATIENT)
Dept: OBGYN CLINIC | Facility: CLINIC | Age: 24
End: 2024-03-29

## 2024-03-29 VITALS
SYSTOLIC BLOOD PRESSURE: 111 MMHG | HEART RATE: 79 BPM | BODY MASS INDEX: 28.25 KG/M2 | WEIGHT: 149.6 LBS | DIASTOLIC BLOOD PRESSURE: 78 MMHG | HEIGHT: 61 IN

## 2024-03-29 DIAGNOSIS — Z11.3 SCREEN FOR STD (SEXUALLY TRANSMITTED DISEASE): ICD-10-CM

## 2024-03-29 DIAGNOSIS — Z12.4 CERVICAL CANCER SCREENING: ICD-10-CM

## 2024-03-29 DIAGNOSIS — Z01.419 WOMEN'S ANNUAL ROUTINE GYNECOLOGICAL EXAMINATION: Primary | ICD-10-CM

## 2024-03-29 DIAGNOSIS — Z12.39 ENCOUNTER FOR BREAST CANCER SCREENING USING NON-MAMMOGRAM MODALITY: ICD-10-CM

## 2024-03-29 PROCEDURE — 87491 CHLMYD TRACH DNA AMP PROBE: CPT | Performed by: NURSE PRACTITIONER

## 2024-03-29 PROCEDURE — 87591 N.GONORRHOEAE DNA AMP PROB: CPT | Performed by: NURSE PRACTITIONER

## 2024-03-29 PROCEDURE — 88175 CYTOPATH C/V AUTO FLUID REDO: CPT | Performed by: NURSE PRACTITIONER

## 2024-03-29 PROCEDURE — 99395 PREV VISIT EST AGE 18-39: CPT | Performed by: NURSE PRACTITIONER

## 2024-03-29 NOTE — PROGRESS NOTES
ANNUAL GYNECOLOGICAL EXAMINATION    Shikha Borrero is a 24 y.o. female who presents today for annual GYN exam.  Her last pap smear was performed 2021 and result was NILM.  She reports no history of abnormal pap smears in her past. She has not received the HPV vaccines in the past. She has not recently had an HIV screening performed.  She reports menses as irregular with Kyleena IUD.  No LMP recorded (lmp unknown). (Menstrual status: Birth Control).  Her general medical history has been reviewed and she reports it as follows:    Past Medical History:   Diagnosis Date    Headache, tension-type      History reviewed. No pertinent surgical history.  OB History          0    Para   0    Term   0       0    AB   0    Living   0         SAB   0    IAB   0    Ectopic   0    Multiple   0    Live Births   0               Social History     Tobacco Use    Smoking status: Never    Smokeless tobacco: Never   Vaping Use    Vaping status: Never Used   Substance Use Topics    Alcohol use: Never    Drug use: Never     Social History     Substance and Sexual Activity   Sexual Activity Yes    Partners: Male    Birth control/protection: I.U.D.     Cancer-related family history is negative for Thyroid cancer, Breast cancer, Ovarian cancer, Cervical cancer, and Colon cancer.    Current Outpatient Medications   Medication Instructions    amitriptyline (ELAVIL) 25 mg, Oral, Daily at bedtime, After 1 week, can increase up to 1 tablet at bedtime    levonorgestrel (KYLEENA) 19.5 MG intrauterine device 1 each, Intrauterine Device, Once every 5 years - IUD    naproxen (NAPROSYN) 500 mg, Oral, 2 times daily with meals    ondansetron (ZOFRAN) 4 mg, Oral, Every 6 hours    rizatriptan (MAXALT) 10 mg, Oral, As needed, Take at the onset of migraine; if symptoms continue or return, may take another dose at least 2 hours after first dose. Take no more than 2 doses in a day or 9 doses a week       Review of Systems:  Review  "of Systems   Constitutional: Negative.    Gastrointestinal: Negative.    Genitourinary: Negative.    Skin: Negative.        Physical Exam:  /78 (BP Location: Right arm, Patient Position: Sitting)   Pulse 79   Ht 5' 1\" (1.549 m)   Wt 67.9 kg (149 lb 9.6 oz)   LMP  (LMP Unknown)   BMI 28.27 kg/m²   Physical Exam  Constitutional:       General: She is not in acute distress.     Appearance: Normal appearance.   Genitourinary:      Vulva and bladder normal.      No lesions in the vagina.      No vaginal erythema or ulceration.        Right Adnexa: not tender and no mass present.     Left Adnexa: not tender and no mass present.     No cervical motion tenderness or lesion.      IUD strings visualized.      Uterus is not enlarged or tender.      No uterine mass detected.  Breasts:     Right: No mass, nipple discharge or skin change.      Left: No mass, nipple discharge or skin change.   Cardiovascular:      Rate and Rhythm: Normal rate and regular rhythm.   Pulmonary:      Effort: Pulmonary effort is normal.      Breath sounds: Normal breath sounds.   Abdominal:      General: Abdomen is flat.      Palpations: Abdomen is soft.   Musculoskeletal:      Cervical back: Neck supple.   Neurological:      Mental Status: She is alert.   Skin:     General: Skin is warm and dry.   Psychiatric:         Mood and Affect: Mood normal.         Behavior: Behavior normal.   Vitals reviewed.         Assessment/Plan:   1. Normal well-woman GYN exam.  2. Cervical cancer screening:  Normal cervical exam.  Pap smear done.  Has not received HPV vaccine in the past. Offered vaccine series to patient now and she declines.     3. STD screening: Orders placed for vaginal GC/CT cultures.  Orders placed for serum anti-HIV, anti-HCV, HbsAg, syphilis panel.   4. Breast cancer screening:  Normal breast exam. Reviewed breast self-awareness.   5. Depression Screening: Patient's depression screening was assessed with a PHQ-2 score of 0. Their PHQ-9 " score was 1. Clinically patient does not have depression. No treatment is required.    6. BMI Counseling: Body mass index is 28.27 kg/m². Discussed the patient's BMI with her. The BMI is above normal. Exercise recommendations include exercising 3-5 times per week.   7. Contraception:  Kyleena IUD in place.    8. Return to office in one year for annual or sooner if needed.    Reviewed with patient that [facility] immediately, but that they will not necessarily be reviewed by me immediately.  Explained that I will review results at my earliest opportunity and contact patient appropriately.

## 2024-04-02 LAB
C TRACH DNA SPEC QL NAA+PROBE: NEGATIVE
N GONORRHOEA DNA SPEC QL NAA+PROBE: NEGATIVE

## 2024-04-07 LAB
LAB AP GYN PRIMARY INTERPRETATION: NORMAL
Lab: NORMAL

## 2024-04-25 ENCOUNTER — TELEPHONE (OUTPATIENT)
Dept: NEUROLOGY | Facility: CLINIC | Age: 24
End: 2024-04-25

## 2025-05-22 ENCOUNTER — TELEPHONE (OUTPATIENT)
Age: 25
End: 2025-05-22

## 2025-05-22 NOTE — TELEPHONE ENCOUNTER
Patient called in with abdominal pain and vaginal bleeding. 5/10 pain, denies saturating more than 1 pad/hr. Chart review noted [facility] patient, provided phone number for correct office for further recommendations.    used #[id number]